# Patient Record
Sex: FEMALE | Race: WHITE | Employment: OTHER | URBAN - METROPOLITAN AREA
[De-identification: names, ages, dates, MRNs, and addresses within clinical notes are randomized per-mention and may not be internally consistent; named-entity substitution may affect disease eponyms.]

---

## 2018-07-06 ENCOUNTER — HOSPITAL ENCOUNTER (OUTPATIENT)
Dept: CARDIOLOGY | Age: 38
Discharge: HOME | End: 2018-07-06
Payer: COMMERCIAL

## 2018-07-06 ENCOUNTER — OFFICE VISIT (OUTPATIENT)
Dept: CARDIOLOGY CLINIC | Age: 38
End: 2018-07-06
Payer: COMMERCIAL

## 2018-07-06 VITALS
BODY MASS INDEX: 22.02 KG/M2 | OXYGEN SATURATION: 99 % | HEART RATE: 76 BPM | WEIGHT: 137 LBS | HEIGHT: 66 IN | SYSTOLIC BLOOD PRESSURE: 120 MMHG | DIASTOLIC BLOOD PRESSURE: 70 MMHG

## 2018-07-06 VITALS
SYSTOLIC BLOOD PRESSURE: 100 MMHG | HEART RATE: 67 BPM | BODY MASS INDEX: 22.02 KG/M2 | DIASTOLIC BLOOD PRESSURE: 60 MMHG | HEIGHT: 66 IN | WEIGHT: 137 LBS

## 2018-07-06 DIAGNOSIS — I44.0 FIRST DEGREE ATRIOVENTRICULAR BLOCK: Primary | ICD-10-CM

## 2018-07-06 DIAGNOSIS — I44.0 FIRST DEGREE ATRIOVENTRICULAR BLOCK: ICD-10-CM

## 2018-07-06 DIAGNOSIS — J93.83 SPONTANEOUS PNEUMOTHORAX: ICD-10-CM

## 2018-07-06 PROCEDURE — 99215 OFFICE O/P EST HI 40 MIN: CPT | Performed by: INTERNAL MEDICINE

## 2018-07-06 PROCEDURE — G8420 CALC BMI NORM PARAMETERS: HCPCS | Performed by: INTERNAL MEDICINE

## 2018-07-06 PROCEDURE — G8427 DOCREV CUR MEDS BY ELIG CLIN: HCPCS | Performed by: INTERNAL MEDICINE

## 2018-07-06 PROCEDURE — 93000 ELECTROCARDIOGRAM COMPLETE: CPT | Performed by: INTERNAL MEDICINE

## 2018-07-06 PROCEDURE — 4004F PT TOBACCO SCREEN RCVD TLK: CPT | Performed by: INTERNAL MEDICINE

## 2018-07-06 PROCEDURE — 93017 CV STRESS TEST TRACING ONLY: CPT

## 2018-07-06 RX ORDER — IBUPROFEN 200 MG
200 TABLET ORAL EVERY 6 HOURS PRN
COMMUNITY
End: 2019-12-11 | Stop reason: ALTCHOICE

## 2018-07-06 RX ORDER — METHYLPREDNISOLONE 4 MG/1
4 TABLET ORAL PRN
COMMUNITY
End: 2019-12-11 | Stop reason: ALTCHOICE

## 2018-07-06 RX ORDER — BENZONATATE 100 MG/1
200 CAPSULE ORAL 3 TIMES DAILY PRN
COMMUNITY
End: 2019-12-11 | Stop reason: ALTCHOICE

## 2018-07-06 RX ORDER — ALBUTEROL SULFATE 90 UG/1
2 AEROSOL, METERED RESPIRATORY (INHALATION) EVERY 6 HOURS PRN
COMMUNITY

## 2018-07-06 RX ORDER — CETIRIZINE HYDROCHLORIDE 10 MG/1
10 TABLET ORAL DAILY
COMMUNITY

## 2018-07-06 RX ORDER — DOXYCYCLINE HYCLATE 100 MG
100 TABLET ORAL 2 TIMES DAILY PRN
COMMUNITY
End: 2019-12-11 | Stop reason: ALTCHOICE

## 2018-07-06 NOTE — PROGRESS NOTES
OUTPATIENT CARDIOLOGY FOLLOW-UP    Name: Andre Arvizu    Age: 40 y.o. Primary Care Physician: Jorge Horowitz MD    Date of Service: 7/6/2018    Chief Complaint: First-degree atrioventricular block    Interim History: Since her prior evaluation of 3 years earlier, the patient was hospitalized at [facility] on 3 separate occasions over the past 2 years due to a spontaneous pneumothorax. During her August, 2016 evidence of sinus pauses all of less than 3 seconds duration were noted with electrophysiology and cardiovascular evaluation obtained along with the implantation of a loop recorder with feelings that evidence of autonomic imbalance as opposed to that appear sinus node dysfunction with the predominant origin. At that time, a loop recorder was implanted with subsequent follow-up monitoring demonstrating periods of bradycardia arrhythmias to approximately 40 bpm with no documentation of high-grade atrioventricular block. At present, she relates continued exertional intolerance and exertional fatigue along with that of intermittent palpitations with activity in the absence of anginal-like chest discomfort or other ischemic equivalents or additional arrhythmia related manifestations. During recent evaluation in your office, she underwent an echocardiogram interpreted by a radiologist as reportedly demonstrating evidence of a normal size left trigger chamber with normal left ventricular systolic function with reported mitral valve prolapse and moderate associated mitral regurgitation with right ventricular enlargement. Review of Systems: The remainder of a complete multisystem review including consitutional, central nervous, respiratory, circulatory, gastrointestinal, genitourinary, endocrinologic, hematologic, musculoskeletal and psychiatric are negative.     Past Medical History:  Past Medical History:   Diagnosis Date    Asthma     Bulging discs     LUMBAR SPINE 4 DISCS    by mouth daily      ibuprofen (ADVIL;MOTRIN) 200 MG tablet Take 200 mg by mouth every 6 hours as needed for Pain      ALPRAZolam (XANAX) 1 MG tablet Take 1 mg by mouth 3 times daily as needed. No current facility-administered medications for this visit. Physical Exam:  /60   Pulse 67   Ht 5' 6\" (1.676 m)   Wt 137 lb (62.1 kg)   BMI 22.11 kg/m²   Wt Readings from Last 3 Encounters:   07/06/18 137 lb (62.1 kg)   02/19/16 136 lb (61.7 kg)   12/14/15 136 lb (61.7 kg)     The patient is awake, alert and in no discomfort or distress. No gross musculoskeletal deformity is present. No significant skin or nail changes are present. Gross examination of head, eyes, nose and throat are negative. Jugular venous pressure is normal and no carotid bruits are present. Normal respiratory effort is noted with no accessory muscle usage present. Lung fields are clear to ascultation. Cardiac examination is notable for a regular rate and rhythm with no palpable thrill. No gallop rhythm, click or cardiac murmur are identified. A benign abdominal examination is present with no masses or organomegaly. Intact pulses are present throughout all extremities and no peripheral edema is present. No focal neurologic deficits are present. Laboratory Tests:  Lab Results   Component Value Date    CREATININE 0.8 05/01/2017    BUN 7 05/01/2017     05/01/2017    K 4.0 05/01/2017     05/01/2017    CO2 23 05/01/2017     No results found for: BNP  Lab Results   Component Value Date    WBC 10.6 05/01/2017    RBC 4.58 05/01/2017    HGB 13.9 05/01/2017    HCT 42.6 05/01/2017    MCV 93.0 05/01/2017    MCH 30.3 05/01/2017    MCHC 32.6 05/01/2017    RDW 12.2 05/01/2017     05/01/2017    MPV 10.3 05/01/2017     No results for input(s): ALKPHOS, ALT, AST, PROT, BILITOT, BILIDIR, LABALBU in the last 72 hours.   Lab Results   Component Value Date    MG 2.1 05/01/2017     No results found for: PROTIME, INR  Lab Results Component Value Date    TSH 1.970 05/01/2017     No components found for: CHLPL  No results found for: TRIG  No results found for: HDL  No results found for: [facility]    Cardiac Tests:  ECG: A resting electrocardiogram demonstrates evidence of sinus rhythm with a first-degree atrioventricular block and AR interval approximately 290 ms  Interrogation of her loop recorder demonstrates evidence of sinus bradycardia with heart rates ranging 40-60 bpm with no apparent high-grade atrioventricular block or additional significant bradycardia  Last echocardiogram: An echocardiogram of June, 2018 interpreted by Lucille Molina reportedly demonstrated evidence of a normal size left ventricular chamber with normal left ventricular systolic function with right ventricular dilatation. Mild mitral valve prolapse with moderate mitral regurgitation was additionally reported. Stress test: In conjunction with her evaluation exercise stress test was performed during which she completed 6 minutes 30 seconds on an accelerated Ma[place] protocol treadmill achieving 76% of age-predicted maximum her neck functional capacity of 11.7 METs. A clinically and electrocardiographically nonischemic response was noted with a normal functional capacity and absence of limiting symptoms of a cardiovascular nature. ASSESSMENT / PLAN:  On a clinical basis, the patient presents with evidence of conduction system disease inclusive of a first-degree atrioventricular block as well as that of a potential mild component of asymptomatic sinus node dysfunction without significant chronotropic incompetence necessitating additional present management. This is extensively been discussed along with the absence of a clear cardiovascular associated origin of her exercise intolerance and/or fatigue.  I have thus have not recommended any intervention beyond that of continued symptom monitoring and continued monitoring of her event recorder with Adalgisa Domínguez along with that of

## 2018-07-06 NOTE — PATIENT INSTRUCTIONS
smoking, you improve the health of everyone who now breathes in your smoke. · Their heart, lung, and cancer risks drop, much like yours. · They are sick less. For babies and small children, living smoke-free means they're less likely to have ear infections, pneumonia, and bronchitis. · If you're a woman who is or will be pregnant someday, quitting smoking means a healthier . · Children who are close to you are less likely to become adult smokers. Where can you learn more? Go to https://Twenty20.compeSword & Plough.Wildfire. org and sign in to your THYME account. Enter [phone] in the [facility] box to learn more about \"Learning About Benefits From Quitting Smoking. \"     If you do not have an account, please click on the \"Sign Up Now\" link. Current as of: 2017  Content Version: 11.6  © 5252-9066 [employer]. Care instructions adapted under license by [facility] ([facility]). If you have questions about a medical condition or this instruction, always ask your healthcare professional. [address] any warranty or liability for your use of this information.

## 2018-07-13 ENCOUNTER — PRE-PROCEDURE TELEPHONE (OUTPATIENT)
Dept: CARDIOLOGY | Age: 38
End: 2018-07-13

## 2018-07-25 ENCOUNTER — TELEPHONE (OUTPATIENT)
Dept: CARDIOLOGY CLINIC | Age: 38
End: 2018-07-25

## 2019-12-11 ENCOUNTER — HOSPITAL ENCOUNTER (OUTPATIENT)
Age: 39
Discharge: HOME | End: 2019-12-11
Payer: COMMERCIAL

## 2019-12-11 ENCOUNTER — ANESTHESIA EVENT (OUTPATIENT)
Dept: OPERATING ROOM | Age: 39
End: 2019-12-11
Payer: COMMERCIAL

## 2019-12-11 LAB
EKG ATRIAL RATE: 52 BPM
EKG P AXIS: 50 DEGREES
EKG P-R INTERVAL: 288 MS
EKG Q-T INTERVAL: 462 MS
EKG QRS DURATION: 92 MS
EKG QTC CALCULATION (BAZETT): 429 MS
EKG R AXIS: 73 DEGREES
EKG T AXIS: 43 DEGREES
EKG VENTRICULAR RATE: 52 BPM

## 2019-12-11 PROCEDURE — 93010 ELECTROCARDIOGRAM REPORT: CPT | Performed by: INTERNAL MEDICINE

## 2019-12-11 PROCEDURE — 93005 ELECTROCARDIOGRAM TRACING: CPT | Performed by: ANESTHESIOLOGY

## 2019-12-13 ASSESSMENT — LIFESTYLE VARIABLES: SMOKING_STATUS: 0

## 2019-12-16 ENCOUNTER — HOSPITAL ENCOUNTER (OUTPATIENT)
Age: 39
Setting detail: OUTPATIENT SURGERY
Discharge: HOME | End: 2019-12-16
Attending: ORTHOPAEDIC SURGERY | Admitting: ORTHOPAEDIC SURGERY
Payer: COMMERCIAL

## 2019-12-16 ENCOUNTER — ANESTHESIA (OUTPATIENT)
Dept: OPERATING ROOM | Age: 39
End: 2019-12-16
Payer: COMMERCIAL

## 2019-12-16 VITALS
TEMPERATURE: 98.6 F | SYSTOLIC BLOOD PRESSURE: 112 MMHG | DIASTOLIC BLOOD PRESSURE: 67 MMHG | OXYGEN SATURATION: 100 % | RESPIRATION RATE: 16 BRPM

## 2019-12-16 VITALS
SYSTOLIC BLOOD PRESSURE: 115 MMHG | RESPIRATION RATE: 16 BRPM | BODY MASS INDEX: 23.63 KG/M2 | HEIGHT: 66 IN | DIASTOLIC BLOOD PRESSURE: 75 MMHG | HEART RATE: 71 BPM | WEIGHT: 147 LBS | TEMPERATURE: 98 F | OXYGEN SATURATION: 100 %

## 2019-12-16 DIAGNOSIS — M77.01 MEDIAL EPICONDYLITIS OF RIGHT ELBOW: Primary | ICD-10-CM

## 2019-12-16 LAB
HCG, URINE, POC: NEGATIVE
Lab: NORMAL
NEGATIVE QC PASS/FAIL: NORMAL
POSITIVE QC PASS/FAIL: NORMAL

## 2019-12-16 PROCEDURE — 6370000000 HC RX 637 (ALT 250 FOR IP): Performed by: ANESTHESIOLOGY

## 2019-12-16 PROCEDURE — 6370000000 HC RX 637 (ALT 250 FOR IP): Performed by: ORTHOPAEDIC SURGERY

## 2019-12-16 PROCEDURE — 81025 URINE PREGNANCY TEST: CPT | Performed by: ORTHOPAEDIC SURGERY

## 2019-12-16 PROCEDURE — 7100000001 HC PACU RECOVERY - ADDTL 15 MIN: Performed by: ORTHOPAEDIC SURGERY

## 2019-12-16 PROCEDURE — 6360000002 HC RX W HCPCS

## 2019-12-16 PROCEDURE — 2500000003 HC RX 250 WO HCPCS: Performed by: ORTHOPAEDIC SURGERY

## 2019-12-16 PROCEDURE — 2580000003 HC RX 258: Performed by: ANESTHESIOLOGY

## 2019-12-16 PROCEDURE — 3700000001 HC ADD 15 MINUTES (ANESTHESIA): Performed by: ORTHOPAEDIC SURGERY

## 2019-12-16 PROCEDURE — 2500000003 HC RX 250 WO HCPCS: Performed by: NURSE ANESTHETIST, CERTIFIED REGISTERED

## 2019-12-16 PROCEDURE — 6360000002 HC RX W HCPCS: Performed by: NURSE ANESTHETIST, CERTIFIED REGISTERED

## 2019-12-16 PROCEDURE — 6360000002 HC RX W HCPCS: Performed by: ANESTHESIOLOGY

## 2019-12-16 PROCEDURE — 2709999900 HC NON-CHARGEABLE SUPPLY: Performed by: ORTHOPAEDIC SURGERY

## 2019-12-16 PROCEDURE — 7100000000 HC PACU RECOVERY - FIRST 15 MIN: Performed by: ORTHOPAEDIC SURGERY

## 2019-12-16 PROCEDURE — 6360000002 HC RX W HCPCS: Performed by: ORTHOPAEDIC SURGERY

## 2019-12-16 PROCEDURE — 7100000010 HC PHASE II RECOVERY - FIRST 15 MIN: Performed by: ORTHOPAEDIC SURGERY

## 2019-12-16 PROCEDURE — 2500000003 HC RX 250 WO HCPCS: Performed by: STUDENT IN AN ORGANIZED HEALTH CARE EDUCATION/TRAINING PROGRAM

## 2019-12-16 PROCEDURE — 3700000000 HC ANESTHESIA ATTENDED CARE: Performed by: ORTHOPAEDIC SURGERY

## 2019-12-16 PROCEDURE — 3600000003 HC SURGERY LEVEL 3 BASE: Performed by: ORTHOPAEDIC SURGERY

## 2019-12-16 PROCEDURE — 3600000013 HC SURGERY LEVEL 3 ADDTL 15MIN: Performed by: ORTHOPAEDIC SURGERY

## 2019-12-16 PROCEDURE — 7100000011 HC PHASE II RECOVERY - ADDTL 15 MIN: Performed by: ORTHOPAEDIC SURGERY

## 2019-12-16 PROCEDURE — 2580000003 HC RX 258: Performed by: ORTHOPAEDIC SURGERY

## 2019-12-16 RX ORDER — FAMOTIDINE 20 MG/1
20 TABLET, FILM COATED ORAL ONCE
Status: COMPLETED | OUTPATIENT
Start: 2019-12-16 | End: 2019-12-16

## 2019-12-16 RX ORDER — PHENYLEPHRINE HYDROCHLORIDE 10 MG/ML
INJECTION INTRAVENOUS PRN
Status: DISCONTINUED | OUTPATIENT
Start: 2019-12-16 | End: 2019-12-16 | Stop reason: SDUPTHER

## 2019-12-16 RX ORDER — PROPOFOL 10 MG/ML
INJECTION, EMULSION INTRAVENOUS PRN
Status: DISCONTINUED | OUTPATIENT
Start: 2019-12-16 | End: 2019-12-16 | Stop reason: SDUPTHER

## 2019-12-16 RX ORDER — METOCLOPRAMIDE 10 MG/1
10 TABLET ORAL ONCE
Status: COMPLETED | OUTPATIENT
Start: 2019-12-16 | End: 2019-12-16

## 2019-12-16 RX ORDER — SODIUM CHLORIDE 0.9 % (FLUSH) 0.9 %
10 SYRINGE (ML) INJECTION PRN
Status: DISCONTINUED | OUTPATIENT
Start: 2019-12-16 | End: 2019-12-16 | Stop reason: HOSPADM

## 2019-12-16 RX ORDER — DEXAMETHASONE SODIUM PHOSPHATE 10 MG/ML
INJECTION, SOLUTION INTRAMUSCULAR; INTRAVENOUS PRN
Status: DISCONTINUED | OUTPATIENT
Start: 2019-12-16 | End: 2019-12-16 | Stop reason: SDUPTHER

## 2019-12-16 RX ORDER — SODIUM CHLORIDE 9 MG/ML
INJECTION, SOLUTION INTRAVENOUS CONTINUOUS
Status: DISCONTINUED | OUTPATIENT
Start: 2019-12-16 | End: 2019-12-16 | Stop reason: HOSPADM

## 2019-12-16 RX ORDER — ONDANSETRON 2 MG/ML
INJECTION INTRAMUSCULAR; INTRAVENOUS PRN
Status: DISCONTINUED | OUTPATIENT
Start: 2019-12-16 | End: 2019-12-16 | Stop reason: SDUPTHER

## 2019-12-16 RX ORDER — LIDOCAINE HYDROCHLORIDE 20 MG/ML
INJECTION, SOLUTION INTRAVENOUS PRN
Status: DISCONTINUED | OUTPATIENT
Start: 2019-12-16 | End: 2019-12-16 | Stop reason: SDUPTHER

## 2019-12-16 RX ORDER — DIAPER,BRIEF,INFANT-TODD,DISP
EACH MISCELLANEOUS PRN
Status: DISCONTINUED | OUTPATIENT
Start: 2019-12-16 | End: 2019-12-16 | Stop reason: ALTCHOICE

## 2019-12-16 RX ORDER — CLINDAMYCIN PHOSPHATE 900 MG/50ML
900 INJECTION INTRAVENOUS
Status: COMPLETED | OUTPATIENT
Start: 2019-12-16 | End: 2019-12-16

## 2019-12-16 RX ORDER — FENTANYL CITRATE 50 UG/ML
INJECTION, SOLUTION INTRAMUSCULAR; INTRAVENOUS PRN
Status: DISCONTINUED | OUTPATIENT
Start: 2019-12-16 | End: 2019-12-16 | Stop reason: SDUPTHER

## 2019-12-16 RX ORDER — SODIUM CHLORIDE, SODIUM LACTATE, POTASSIUM CHLORIDE, CALCIUM CHLORIDE 600; 310; 30; 20 MG/100ML; MG/100ML; MG/100ML; MG/100ML
INJECTION, SOLUTION INTRAVENOUS CONTINUOUS
Status: DISCONTINUED | OUTPATIENT
Start: 2019-12-16 | End: 2019-12-16 | Stop reason: HOSPADM

## 2019-12-16 RX ORDER — GLYCOPYRROLATE 1 MG/5 ML
SYRINGE (ML) INTRAVENOUS PRN
Status: DISCONTINUED | OUTPATIENT
Start: 2019-12-16 | End: 2019-12-16 | Stop reason: SDUPTHER

## 2019-12-16 RX ORDER — FENTANYL CITRATE 50 UG/ML
25 INJECTION, SOLUTION INTRAMUSCULAR; INTRAVENOUS EVERY 5 MIN PRN
Status: DISCONTINUED | OUTPATIENT
Start: 2019-12-16 | End: 2019-12-16 | Stop reason: HOSPADM

## 2019-12-16 RX ORDER — SODIUM CHLORIDE 0.9 % (FLUSH) 0.9 %
10 SYRINGE (ML) INJECTION EVERY 12 HOURS SCHEDULED
Status: DISCONTINUED | OUTPATIENT
Start: 2019-12-16 | End: 2019-12-16 | Stop reason: HOSPADM

## 2019-12-16 RX ORDER — HYDROMORPHONE HYDROCHLORIDE 1 MG/ML
0.5 INJECTION, SOLUTION INTRAMUSCULAR; INTRAVENOUS; SUBCUTANEOUS EVERY 5 MIN PRN
Status: DISCONTINUED | OUTPATIENT
Start: 2019-12-16 | End: 2019-12-16 | Stop reason: HOSPADM

## 2019-12-16 RX ORDER — MIDAZOLAM HYDROCHLORIDE 1 MG/ML
INJECTION INTRAMUSCULAR; INTRAVENOUS PRN
Status: DISCONTINUED | OUTPATIENT
Start: 2019-12-16 | End: 2019-12-16 | Stop reason: SDUPTHER

## 2019-12-16 RX ORDER — HYDROCODONE BITARTRATE AND ACETAMINOPHEN 5; 325 MG/1; MG/1
1 TABLET ORAL PRN
Status: COMPLETED | OUTPATIENT
Start: 2019-12-16 | End: 2019-12-16

## 2019-12-16 RX ORDER — MORPHINE SULFATE 10 MG/ML
INJECTION, SOLUTION INTRAMUSCULAR; INTRAVENOUS
Status: COMPLETED
Start: 2019-12-16 | End: 2019-12-16

## 2019-12-16 RX ORDER — MORPHINE SULFATE 2 MG/ML
2 INJECTION, SOLUTION INTRAMUSCULAR; INTRAVENOUS EVERY 5 MIN PRN
Status: DISCONTINUED | OUTPATIENT
Start: 2019-12-16 | End: 2019-12-16 | Stop reason: HOSPADM

## 2019-12-16 RX ORDER — HYDROCODONE BITARTRATE AND ACETAMINOPHEN 5; 325 MG/1; MG/1
2 TABLET ORAL PRN
Status: COMPLETED | OUTPATIENT
Start: 2019-12-16 | End: 2019-12-16

## 2019-12-16 RX ORDER — POVIDONE-IODINE 10 MG/G
OINTMENT TOPICAL PRN
Status: DISCONTINUED | OUTPATIENT
Start: 2019-12-16 | End: 2019-12-16 | Stop reason: HOSPADM

## 2019-12-16 RX ORDER — HYDROCODONE BITARTRATE AND ACETAMINOPHEN 5; 325 MG/1; MG/1
1 TABLET ORAL EVERY 8 HOURS PRN
Qty: 21 TABLET | Refills: 0 | Status: SHIPPED | OUTPATIENT
Start: 2019-12-16 | End: 2019-12-23

## 2019-12-16 RX ORDER — CLINDAMYCIN PHOSPHATE 900 MG/50ML
900 INJECTION INTRAVENOUS
Qty: 50 ML | Refills: 0 | Status: SHIPPED | OUTPATIENT
Start: 2019-12-16 | End: 2019-12-16

## 2019-12-16 RX ORDER — BUPIVACAINE HYDROCHLORIDE AND EPINEPHRINE 5; 5 MG/ML; UG/ML
INJECTION, SOLUTION EPIDURAL; INTRACAUDAL; PERINEURAL PRN
Status: DISCONTINUED | OUTPATIENT
Start: 2019-12-16 | End: 2019-12-16 | Stop reason: ALTCHOICE

## 2019-12-16 RX ADMIN — FENTANYL CITRATE 25 MCG: 50 INJECTION, SOLUTION INTRAMUSCULAR; INTRAVENOUS at 07:00

## 2019-12-16 RX ADMIN — DEXAMETHASONE SODIUM PHOSPHATE 10 MG: 10 INJECTION, SOLUTION INTRAMUSCULAR; INTRAVENOUS at 07:12

## 2019-12-16 RX ADMIN — MIDAZOLAM 2 MG: 1 INJECTION INTRAMUSCULAR; INTRAVENOUS at 06:59

## 2019-12-16 RX ADMIN — HYDROCODONE BITARTRATE AND ACETAMINOPHEN 1 TABLET: 5; 325 TABLET ORAL at 08:35

## 2019-12-16 RX ADMIN — PHENYLEPHRINE HYDROCHLORIDE 100 MCG: 10 INJECTION INTRAVENOUS at 07:13

## 2019-12-16 RX ADMIN — FENTANYL CITRATE 50 MCG: 50 INJECTION, SOLUTION INTRAMUSCULAR; INTRAVENOUS at 07:07

## 2019-12-16 RX ADMIN — FENTANYL CITRATE 25 MCG: 50 INJECTION, SOLUTION INTRAMUSCULAR; INTRAVENOUS at 07:33

## 2019-12-16 RX ADMIN — CLINDAMYCIN PHOSPHATE 900 MG: 18 INJECTION, SOLUTION INTRAVENOUS at 07:00

## 2019-12-16 RX ADMIN — SODIUM CHLORIDE, POTASSIUM CHLORIDE, SODIUM LACTATE AND CALCIUM CHLORIDE: 600; 310; 30; 20 INJECTION, SOLUTION INTRAVENOUS at 06:59

## 2019-12-16 RX ADMIN — PROPOFOL 150 MG: 10 INJECTION, EMULSION INTRAVENOUS at 07:07

## 2019-12-16 RX ADMIN — FENTANYL CITRATE 25 MCG: 50 INJECTION, SOLUTION INTRAMUSCULAR; INTRAVENOUS at 07:40

## 2019-12-16 RX ADMIN — METOCLOPRAMIDE 10 MG: 10 TABLET ORAL at 06:30

## 2019-12-16 RX ADMIN — FENTANYL CITRATE 25 MCG: 50 INJECTION, SOLUTION INTRAMUSCULAR; INTRAVENOUS at 07:50

## 2019-12-16 RX ADMIN — FENTANYL CITRATE 25 MCG: 50 INJECTION, SOLUTION INTRAMUSCULAR; INTRAVENOUS at 06:59

## 2019-12-16 RX ADMIN — FENTANYL CITRATE 25 MCG: 50 INJECTION, SOLUTION INTRAMUSCULAR; INTRAVENOUS at 07:24

## 2019-12-16 RX ADMIN — FAMOTIDINE 20 MG: 20 TABLET ORAL at 06:30

## 2019-12-16 RX ADMIN — ONDANSETRON HYDROCHLORIDE 4 MG: 2 INJECTION, SOLUTION INTRAMUSCULAR; INTRAVENOUS at 07:28

## 2019-12-16 RX ADMIN — CEFAZOLIN 2 G: 1 INJECTION, POWDER, FOR SOLUTION INTRAMUSCULAR; INTRAVENOUS at 07:00

## 2019-12-16 RX ADMIN — MORPHINE SULFATE 2 MG: 2 INJECTION, SOLUTION INTRAMUSCULAR; INTRAVENOUS at 08:17

## 2019-12-16 RX ADMIN — Medication 0.2 MG: at 07:07

## 2019-12-16 RX ADMIN — MORPHINE SULFATE 2 MG: 10 INJECTION INTRAVENOUS at 08:10

## 2019-12-16 RX ADMIN — FENTANYL CITRATE 25 MCG: 50 INJECTION, SOLUTION INTRAMUSCULAR; INTRAVENOUS at 07:18

## 2019-12-16 RX ADMIN — FENTANYL CITRATE 25 MCG: 50 INJECTION, SOLUTION INTRAMUSCULAR; INTRAVENOUS at 07:54

## 2019-12-16 RX ADMIN — LIDOCAINE HYDROCHLORIDE 60 MG: 20 INJECTION, SOLUTION INTRAVENOUS at 07:07

## 2019-12-16 RX ADMIN — SODIUM CHLORIDE, POTASSIUM CHLORIDE, SODIUM LACTATE AND CALCIUM CHLORIDE: 600; 310; 30; 20 INJECTION, SOLUTION INTRAVENOUS at 06:49

## 2019-12-16 ASSESSMENT — PAIN SCALES - GENERAL
PAINLEVEL_OUTOF10: 8
PAINLEVEL_OUTOF10: 10
PAINLEVEL_OUTOF10: 4
PAINLEVEL_OUTOF10: 8
PAINLEVEL_OUTOF10: 7
PAINLEVEL_OUTOF10: 0
PAINLEVEL_OUTOF10: 7
PAINLEVEL_OUTOF10: 8
PAINLEVEL_OUTOF10: 10

## 2019-12-16 ASSESSMENT — PULMONARY FUNCTION TESTS
PIF_VALUE: 2
PIF_VALUE: 4
PIF_VALUE: 2
PIF_VALUE: 4
PIF_VALUE: 2
PIF_VALUE: 4
PIF_VALUE: 1
PIF_VALUE: 2
PIF_VALUE: 21
PIF_VALUE: 2
PIF_VALUE: 8
PIF_VALUE: 2
PIF_VALUE: 0
PIF_VALUE: 3
PIF_VALUE: 2
PIF_VALUE: 1
PIF_VALUE: 2
PIF_VALUE: 2
PIF_VALUE: 4
PIF_VALUE: 2
PIF_VALUE: 2
PIF_VALUE: 1
PIF_VALUE: 2
PIF_VALUE: 16
PIF_VALUE: 2
PIF_VALUE: 1
PIF_VALUE: 2
PIF_VALUE: 1

## 2019-12-16 ASSESSMENT — PAIN DESCRIPTION - PROGRESSION
CLINICAL_PROGRESSION: GRADUALLY IMPROVING

## 2019-12-16 ASSESSMENT — PAIN DESCRIPTION - PAIN TYPE
TYPE: SURGICAL PAIN

## 2019-12-16 ASSESSMENT — PAIN DESCRIPTION - DESCRIPTORS: DESCRIPTORS: ACHING;DISCOMFORT

## 2019-12-16 ASSESSMENT — PAIN - FUNCTIONAL ASSESSMENT: PAIN_FUNCTIONAL_ASSESSMENT: 0-10

## 2020-01-20 VITALS
TEMPERATURE: 97.8 F | BODY MASS INDEX: 23.63 KG/M2 | HEIGHT: 66 IN | SYSTOLIC BLOOD PRESSURE: 130 MMHG | WEIGHT: 147 LBS | DIASTOLIC BLOOD PRESSURE: 82 MMHG | HEART RATE: 69 BPM

## 2020-01-20 RX ORDER — PREDNISONE 20 MG/1
20 TABLET ORAL DAILY
COMMUNITY
End: 2020-02-10

## 2020-01-20 RX ORDER — MECLIZINE HYDROCHLORIDE 25 MG/1
25 TABLET ORAL 3 TIMES DAILY PRN
COMMUNITY
End: 2020-02-10

## 2020-01-20 RX ORDER — IBUPROFEN 200 MG
200 TABLET ORAL PRN
COMMUNITY

## 2020-01-20 RX ORDER — DEXTROMETHORPHAN HYDROBROMIDE AND PROMETHAZINE HYDROCHLORIDE 15; 6.25 MG/5ML; MG/5ML
5 SYRUP ORAL 4 TIMES DAILY PRN
COMMUNITY
End: 2020-02-10

## 2020-01-20 RX ORDER — IPRATROPIUM BROMIDE AND ALBUTEROL SULFATE 2.5; .5 MG/3ML; MG/3ML
1 SOLUTION RESPIRATORY (INHALATION) EVERY 4 HOURS
COMMUNITY

## 2020-01-20 RX ORDER — LORATADINE 10 MG/1
10 CAPSULE, LIQUID FILLED ORAL DAILY
COMMUNITY
End: 2020-02-10

## 2020-02-03 NOTE — PROGRESS NOTES
[facility] Cardiology Progress Note  Dr. Hazel Montero      Referring Physician: Alfredo Bentley MD  CHIEF COMPLAINT:   Chief Complaint   Patient presents with    Palpitations     Annual. Pt has no cardiac complaints today       HISTORY OF PRESENT ILLNESS:   Patient is 46year old female with past medical history of first degree atrioventricular block, mitral valve regurgitation, asthma, bulging discs, is here for follow-up appointment  Occasional extra beats here and there,Patient denies any chest pain, no shortness of breath, no lightheadedness, no dizziness,  no pedal edema, no PND, no orthopnea, no syncope, no presyncopal episodes. Had loop recorder implanted 2 years ago for evaluation of her bradycardia and pauses, she follows with Dr. Helena Dietrich  Functional capacity is at baseline   Just lost her stepdad      Past Medical History:   Diagnosis Date    Acute upper respiratory infection, unspecified     Asthma     Bradycardia     occurs during sleep ( HR drops to 30-40). see's Dr Helena Dietrich   (has Loop recorder)  denies any dizziness or syncope    Bulging discs     LUMBAR SPINE 4 DISCS    COPD (chronic obstructive pulmonary disease) (HCC)     mild    MVP (mitral valve prolapse)     Palpitations     Slipped intervertebral disc     L5-S1    Unspecified asthma with (acute) exacerbation     Vaginal delivery     x 2         Past Surgical History:   Procedure Laterality Date    DILATION AND CURETTAGE OF UTERUS      ELBOW FRACTURE SURGERY Right 12/16/2019    REPAIR RIGHT LATERAL EPICONDYLECTOMY (CPT 31784) performed by Riley Perales DO at [place] [id number] Right 12/16/2019    INSERTABLE CARDIAC MONITOR  08/2016    Dr Ashleigh Gardner Left 10/26/2016    Excision of apical left lung, apical blebs.  Pleurodesis  had spontaneous pneumothorax    NECK SURGERY      CYST AND HYOID BONE REMOVED  Dr Alyssa Pinto Left 10/18/2016    for spontaneous pneumothorax Current Outpatient Medications   Medication Sig Dispense Refill    ipratropium-albuterol (DUONEB) 0.5-2.5 (3) MG/3ML SOLN nebulizer solution Inhale 1 vial into the lungs every 4 hours One neb. Treatment given in uc. [place]      ibuprofen (ADVIL;MOTRIN) 200 MG tablet Take 200 mg by mouth as needed for Pain      mometasone-formoterol (DULERA) 100-5 MCG/ACT inhaler Inhale 2 puffs into the lungs 2 times daily      albuterol sulfate HFA (VENTOLIN HFA) 108 (90 Base) MCG/ACT inhaler Inhale 2 puffs into the lungs every 6 hours as needed for Wheezing      cetirizine (ZYRTEC ALLERGY) 10 MG tablet Take 10 mg by mouth daily      ALPRAZolam (XANAX) 1 MG tablet Take 1 mg by mouth 3 times daily as needed. No current facility-administered medications for this visit. Allergies as of 02/10/2020 - Review Complete 02/10/2020   Allergen Reaction Noted    Penicillins Shortness Of Breath 11/12/2014    Amoxicillin  01/20/2020       Social History     Socioeconomic History    Marital status: Single     Spouse name: Not on file    Number of children: Not on file    Years of education: Not on file    Highest education level: Not on file   Occupational History    Not on file   Social Needs    Financial resource strain: Not on file    Food insecurity:     Worry: Not on file     Inability: Not on file    Transportation needs:     Medical: Not on file     Non-medical: Not on file   Tobacco Use    Smoking status: Former Smoker     Years: 20.00     Types: Cigarettes    Smokeless tobacco: Never Used   Substance and Sexual Activity    Alcohol use: No     Comment: 3 cups coffee per day.  pop daily    Drug use: No    Sexual activity: Not on file   Lifestyle    Physical activity:     Days per week: Not on file     Minutes per session: Not on file    Stress: Not on file   Relationships    Social connections:     Talks on phone: Not on file     Gets together: Not on file     Attends [employer] service: Not normal.   HEMA/LYMPH: No cervical lymphadenopathy and no supraclavicular lymphadenopathy. LUNGS: No increased work of breathing, good air exchange, clear to auscultation bilaterally, no crackles or wheezing. Cardiovascular: Normal apical impulse, regular rate and rhythm, normal S1 and S2, no S3 or S4, 3/6 systolic murmur at the apex, no pedal edema, good carotid upstroke bilaterally, no carotid bruit, no JVD, no abdominal pulsating masses. ABDOMEN: Soft, nontender, no hepatomegaly, no splenomegaly, bowel sound positive. CHEST:  Expands symmetrically, nontender to palpation. Musculoskeletal:  No clubbing or cyanosis. No redness, warmth, or swelling of the joints. Neurological: Alert, awake, and oriented X3, no focal neurologic deficit was appreciated. SKIN: No bruises, no bleeding, normal skin color, texture, turgor and no redness, warmth or swelling. /64 (Site: Left Upper Arm, Position: Sitting, Cuff Size: Medium Adult)   Pulse 67   Resp 16   Ht 5' 6\" (1.676 m)   Wt 148 lb (67.1 kg)   BMI 23.89 kg/m²     DATA:   I personally reviewed the visit EKG with the following interpretation: Sinus rhythm         12/11/19 Sinus bradycardia with sinus arrhythmia with 1st degree AV block  Otherwise normal ECG  No previous ECGs available        ECHO: 3/5/19 Normal left ventricular systolic and diastolic dysfunction EF 15%. Mild mitral regurgitation. Trace tricuspid regurgitation with mild pulmonary HTN. Trace aortic regurgitation. Stress Test: 7/6/18 Impression:    1. Exercise EKG was normal.    2. The patient experienced no chest pain with exercise. 3. [place] treadmill score was 10 implying low risk of acute   ischemic events.    4. Exercise capacity was average.      Cardiology Labs: BMP:    Lab Results   Component Value Date     05/01/2017    K 4.0 05/01/2017     05/01/2017    CO2 23 05/01/2017    BUN 7 05/01/2017    CREATININE 0.8 05/01/2017     CMP:    Lab Results   Component Value Date     05/01/2017    K 4.0 05/01/2017     05/01/2017    CO2 23 05/01/2017    BUN 7 05/01/2017    CREATININE 0.8 05/01/2017    PROT 7.2 05/01/2017     CBC:    Lab Results   Component Value Date    WBC 10.6 05/01/2017    RBC 4.58 05/01/2017    HGB 13.9 05/01/2017    HCT 42.6 05/01/2017    MCV 93.0 05/01/2017    RDW 12.2 05/01/2017     05/01/2017     PT/INR:  No results found for: PTINR  PT/INR Warfarin:  No components found for: PTPATWAR, PTINRWAR  PTT:  No results found for: APTT  PTT Heparin:  No components found for: APTTHEP  Magnesium:    Lab Results   Component Value Date    MG 2.1 05/01/2017     TSH:    Lab Results   Component Value Date    TSH 1.970 05/01/2017     TROPONIN:  No components found for: TROP  BNP:  No results found for: BNP  FASTING LIPID PANEL:  No results found for: CHOL, HDL, TRIG  No orders to display     I have personally reviewed the laboratory, cardiac diagnostic and radiographic testing as outlined above:      IMPRESSION:  1:  Dizziness and giddiness: resolved    2:  Nonrheumatic mitral (valve) insufficiency:  Moderate by an echocardiogram done in June 2018      3 Nonrheumatic tricuspid (valve) insufficiency :  Mild                              RECOMMENDATIONS:   1. Continue current treatment  2. Preventive cardiology: Low-salt, low-cholesterol diet, daily exercise, were all advised. 3.  Follow-up with Dr. Yoly Schmidt as scheduled  4. Follow-up with Dr. Della Jackson in 1 year, sooner if symptomatic for any reason    I have reviewed my findings and recommendations with patient    Electronically signed by Leandro Ricketts MD on 2/10/2020 at 11:07 AM  NOTE: This report was transcribed using voice recognition software.  Every effort was made to ensure accuracy; however, inadvertent computerized transcription errors may be present

## 2020-02-10 ENCOUNTER — OFFICE VISIT (OUTPATIENT)
Dept: CARDIOLOGY CLINIC | Age: 40
End: 2020-02-10
Payer: COMMERCIAL

## 2020-02-10 VITALS
WEIGHT: 148 LBS | DIASTOLIC BLOOD PRESSURE: 64 MMHG | HEIGHT: 66 IN | RESPIRATION RATE: 16 BRPM | BODY MASS INDEX: 23.78 KG/M2 | SYSTOLIC BLOOD PRESSURE: 112 MMHG | HEART RATE: 67 BPM

## 2020-02-10 PROCEDURE — 93000 ELECTROCARDIOGRAM COMPLETE: CPT | Performed by: INTERNAL MEDICINE

## 2020-02-10 PROCEDURE — 99213 OFFICE O/P EST LOW 20 MIN: CPT | Performed by: INTERNAL MEDICINE

## 2021-02-06 NOTE — PROGRESS NOTES
[facility] Cardiology Progress Note  Dr. Mateus Carrera      Referring Physician: Daniella Henry MD  CHIEF COMPLAINT:   Chief Complaint   Patient presents with    Palpitations     Annual. Pt has no cardiac complaints today       HISTORY OF PRESENT ILLNESS:   Patient is 45year old female with past medical history of first degree atrioventricular block, mitral valve regurgitation, asthma, bulging discs, is here for follow-up appointment  Occasional extra beats here and there,Patient denies any chest pain, no shortness of breath, no lightheadedness, no dizziness,  no pedal edema, no PND, no orthopnea, no syncope, no presyncopal episodes. Had loop recorder implanted more than 3 years ago for evaluation of her bradycardia and pauses, she follows with Dr. Jeronimo Oliveira  Functional capacity is at baseline       Past Medical History:   Diagnosis Date    Acute upper respiratory infection, unspecified     Asthma     Bradycardia     occurs during sleep ( HR drops to 30-40). see's Dr Jeronimo Oliviera   (has Loop recorder)  denies any dizziness or syncope    Bulging discs     LUMBAR SPINE 4 DISCS    COPD (chronic obstructive pulmonary disease) (HCC)     mild    MVP (mitral valve prolapse)     Palpitations     Slipped intervertebral disc     L5-S1    Unspecified asthma with (acute) exacerbation     Vaginal delivery     x 2         Past Surgical History:   Procedure Laterality Date    DILATION AND CURETTAGE OF UTERUS      ELBOW FRACTURE SURGERY Right 12/16/2019    REPAIR RIGHT LATERAL EPICONDYLECTOMY (CPT 69060) performed by Nj Ann DO at [place] [address] Right 12/16/2019    INSERTABLE CARDIAC MONITOR  08/2016    Dr Agatha Mcdowell Left 10/26/2016    Excision of apical left lung, apical blebs.  Pleurodesis  had spontaneous pneumothorax    NECK SURGERY      CYST AND HYOID BONE REMOVED  Dr Mark Morrison Left 10/18/2016    for spontaneous pneumothorax Current Outpatient Medications   Medication Sig Dispense Refill    ipratropium-albuterol (DUONEB) 0.5-2.5 (3) MG/3ML SOLN nebulizer solution Inhale 1 vial into the lungs every 4 hours One neb. Treatment given in uc. [place]      ibuprofen (ADVIL;MOTRIN) 200 MG tablet Take 200 mg by mouth as needed for Pain      mometasone-formoterol (DULERA) 100-5 MCG/ACT inhaler Inhale 2 puffs into the lungs 2 times daily      albuterol sulfate HFA (VENTOLIN HFA) 108 (90 Base) MCG/ACT inhaler Inhale 2 puffs into the lungs every 6 hours as needed for Wheezing      cetirizine (ZYRTEC ALLERGY) 10 MG tablet Take 10 mg by mouth daily      ALPRAZolam (XANAX) 1 MG tablet Take 1 mg by mouth 3 times daily as needed. No current facility-administered medications for this visit. Allergies as of 02/11/2021 - Review Complete 02/11/2021   Allergen Reaction Noted    Penicillins Shortness Of Breath 11/12/2014    Amoxicillin  01/20/2020       Social History     Socioeconomic History    Marital status: Single     Spouse name: Not on file    Number of children: Not on file    Years of education: Not on file    Highest education level: Not on file   Occupational History    Not on file   Social Needs    Financial resource strain: Not on file    Food insecurity     Worry: Not on file     Inability: Not on file   [employer] needs     Medical: Not on file     Non-medical: Not on file   Tobacco Use    Smoking status: Former Smoker     Years: 20.00     Types: Cigarettes    Smokeless tobacco: Never Used   Substance and Sexual Activity    Alcohol use: No     Comment: 3 cups coffee per day.  pop daily    Drug use: No    Sexual activity: Not on file   Lifestyle    Physical activity     Days per week: Not on file     Minutes per session: Not on file    Stress: Not on file   Relationships    Social connections     Talks on phone: Not on file     Gets together: Not on file     Attends Taoism service: Not on file     Active member of club or organization: Not on file     Attends meetings of clubs or organizations: Not on file     Relationship status: Not on file    Intimate partner violence     Fear of current or ex partner: Not on file     Emotionally abused: Not on file     Physically abused: Not on file     Forced sexual activity: Not on file   Other Topics Concern    Not on file   Social History Narrative    Not on file       Family History   Problem Relation Age of Onset    Hypertension Mother     COPD Mother     Thyroid Disease Mother     Other Mother 62        pacemaker       REVIEW OF SYSTEMS:     CONSTITUTIONAL:  negative for  fevers, chills, sweats and fatigue  HEENT:  negative for  tinnitus, earaches, nasal congestion and epistaxis  RESPIRATORY:  negative for  dry cough, cough with sputum, dyspnea, wheezing and hemoptysis  GASTROINTESTINAL:  negative for nausea, vomiting, diarrhea, constipation, pruritus and jaundice  HEMATOLOGIC/LYMPHATIC:  negative for easy bruising, bleeding, lymphadenopathy and petechiae  ENDOCRINE:  negative for heat intolerance, cold intolerance, tremor, hair loss and diabetic symptoms including neither polyuria nor polydipsia nor blurred vision  MUSCULOSKELETAL:  negative for  myalgias, arthralgias, joint swelling, stiff joints and decreased range of motion  NEUROLOGICAL:  negative for memory problems, speech problems, visual disturbance, dysphagia, weakness and numbness      PHYSICAL EXAM:   Constitutional:  Awake, alert cooperative, no apparent distress, and appears stated age. EYES: Anicteric sclerae, lids and lashes normal and pupils equal, round and reactive to light. HEENT:  Moist and pink mucous membranes, normocephalic, without obvious abnormality, atraumatic, normal ears and nose.    NECK:  Supple, symmetrical, trachea midline, no JVD, no adenopathy, thyroid symmetric, not enlarged and no tenderness, good carotid upstroke bilaterally, no carotid bruit, skin normal. HEMA/LYMPH: No cervical lymphadenopathy and no supraclavicular lymphadenopathy. LUNGS: No increased work of breathing, good air exchange, clear to auscultation bilaterally, no crackles or wheezing. Cardiovascular: Normal apical impulse, regular rate and rhythm, normal S1 and S2, no S3 or S4, 3/6 systolic murmur at the apex, no pedal edema, good carotid upstroke bilaterally, no carotid bruit, no JVD, no abdominal pulsating masses. ABDOMEN: Soft, nontender, no hepatomegaly, no splenomegaly, bowel sound positive. CHEST:  Expands symmetrically, nontender to palpation. Musculoskeletal:  No clubbing or cyanosis. No redness, warmth, or swelling of the joints. Neurological: Alert, awake, and oriented X3, no focal neurologic deficit was appreciated. SKIN: No bruises, no bleeding, normal skin color, texture, turgor and no redness, warmth or swelling. /78   Pulse 76   Ht 5' 6\" (1.676 m)   Wt 144 lb (65.3 kg)   BMI 23.24 kg/m²     DATA:   I personally reviewed the visit EKG with the following interpretation: Sinus rhythm, first-degree AV block, normal axis, no significant changes when compared to previous    EKG 2/10/20 Sinus rhythm     ECHO: 3/5/19 Normal left ventricular systolic and diastolic dysfunction EF 69%. Mild mitral regurgitation. Trace tricuspid regurgitation with mild pulmonary HTN. Trace aortic regurgitation. Stress Test: 7/6/18 Impression:    1. Exercise EKG was normal.    2. The patient experienced no chest pain with exercise. 3. [place] treadmill score was 10 implying low risk of acute   ischemic events.    4. Exercise capacity was average.      Cardiology Labs: BMP:    Lab Results   Component Value Date     05/01/2017    K 4.0 05/01/2017     05/01/2017    CO2 23 05/01/2017    BUN 7 05/01/2017    CREATININE 0.8 05/01/2017     CMP:    Lab Results   Component Value Date     05/01/2017    K 4.0 05/01/2017     05/01/2017    CO2 23 05/01/2017    BUN 7 05/01/2017    CREATININE 0.8 05/01/2017    PROT 7.2 05/01/2017     CBC:    Lab Results   Component Value Date    WBC 10.6 05/01/2017    RBC 4.58 05/01/2017    HGB 13.9 05/01/2017    HCT 42.6 05/01/2017    MCV 93.0 05/01/2017    RDW 12.2 05/01/2017     05/01/2017     PT/INR:  No results found for: PTINR  PT/INR Warfarin:  No components found for: PTPATWAR, PTINRWAR  PTT:  No results found for: APTT  PTT Heparin:  No components found for: APTTHEP  Magnesium:    Lab Results   Component Value Date    MG 2.1 05/01/2017     TSH:    Lab Results   Component Value Date    TSH 1.970 05/01/2017     TROPONIN:  No components found for: TROP  BNP:  No results found for: BNP  FASTING LIPID PANEL:  No results found for: CHOL, HDL, TRIG  No orders to display     I have personally reviewed the laboratory, cardiac diagnostic and radiographic testing as outlined above:      IMPRESSION:  1:  Dizziness and giddiness: resolved  2:  Nonrheumatic mitral (valve) insufficiency:  Moderate by an echocardiogram done in June 2018    3 Nonrheumatic tricuspid (valve) insufficiency :  Mild                              RECOMMENDATIONS:   1. Continue current treatment  2.  Echocardiogram next visit to reevaluate moderate mitral valve regurgitation  3. Preventive cardiology: Low-salt, low-cholesterol diet, daily exercise, were all advised. 4.  Follow-up with Dr. Anne Marie Crump as scheduled  5. Follow-up with Dr. Salina Moreno in 1 year, sooner if symptomatic for any reason    I have reviewed my findings and recommendations with patient    Electronically signed by Gurinder Schwarz MD on 2/14/2021 at 7:42 PM  NOTE: This report was transcribed using voice recognition software.  Every effort was made to ensure accuracy; however, inadvertent computerized transcription errors may be present

## 2021-02-11 ENCOUNTER — OFFICE VISIT (OUTPATIENT)
Dept: CARDIOLOGY CLINIC | Age: 41
End: 2021-02-11
Payer: COMMERCIAL

## 2021-02-11 VITALS
BODY MASS INDEX: 23.14 KG/M2 | HEART RATE: 76 BPM | SYSTOLIC BLOOD PRESSURE: 126 MMHG | DIASTOLIC BLOOD PRESSURE: 78 MMHG | HEIGHT: 66 IN | WEIGHT: 144 LBS

## 2021-02-11 DIAGNOSIS — R00.2 PALPITATIONS: Primary | ICD-10-CM

## 2021-02-11 PROCEDURE — G8484 FLU IMMUNIZE NO ADMIN: HCPCS | Performed by: INTERNAL MEDICINE

## 2021-02-11 PROCEDURE — G8420 CALC BMI NORM PARAMETERS: HCPCS | Performed by: INTERNAL MEDICINE

## 2021-02-11 PROCEDURE — 1036F TOBACCO NON-USER: CPT | Performed by: INTERNAL MEDICINE

## 2021-02-11 PROCEDURE — 99213 OFFICE O/P EST LOW 20 MIN: CPT | Performed by: INTERNAL MEDICINE

## 2021-02-11 PROCEDURE — 93000 ELECTROCARDIOGRAM COMPLETE: CPT | Performed by: INTERNAL MEDICINE

## 2021-02-11 PROCEDURE — G8427 DOCREV CUR MEDS BY ELIG CLIN: HCPCS | Performed by: INTERNAL MEDICINE

## 2022-03-10 NOTE — PROGRESS NOTES
[facility] Cardiology Progress Note  Dr. Marsha Rodriguez      Referring Physician: Brittany Walker MD  CHIEF COMPLAINT:   Chief Complaint   Patient presents with    Heart Problem     Annual, patient states last weekend she had palpitations and fatigue       HISTORY OF PRESENT ILLNESS:   Patient is 37year old female with past medical history of first degree atrioventricular block, mitral valve regurgitation, asthma, bulging discs, is here for follow-up appointment  Palpitations with shortness of breath with exertion, no chest pain, no lightheadedness, no dizziness,  no pedal edema, no PND, no orthopnea, no syncope, no presyncopal episodes. Functional capacity is at baseline       Past Medical History:   Diagnosis Date    Acute upper respiratory infection, unspecified     Asthma     Bradycardia     occurs during sleep ( HR drops to 30-40). see's Dr Colleen Leo   (has Loop recorder)  denies any dizziness or syncope    Bulging discs     LUMBAR SPINE 4 DISCS    COPD (chronic obstructive pulmonary disease) (HCC)     mild    MVP (mitral valve prolapse)     Palpitations     Slipped intervertebral disc     L5-S1    Unspecified asthma with (acute) exacerbation     Vaginal delivery     x 2         Past Surgical History:   Procedure Laterality Date    DILATION AND CURETTAGE OF UTERUS      ELBOW FRACTURE SURGERY Right 12/16/2019    REPAIR RIGHT LATERAL EPICONDYLECTOMY (CPT 71293) performed by Baxter Ganser, DO at [place] [address] Right 12/16/2019    INSERTABLE CARDIAC MONITOR  08/2016    Dr Polly Montenegro Left 10/26/2016    Excision of apical left lung, apical blebs.  Pleurodesis  had spontaneous pneumothorax    NECK SURGERY      CYST AND HYOID BONE REMOVED  Dr Marion Wall Left 10/18/2016    for spontaneous pneumothorax         Current Outpatient Medications   Medication Sig Dispense Refill    ipratropium-albuterol (DUONEB) 0.5-2.5 (3) MG/3ML SOLN Minutes of Exercise per Session: Not on file   Stress:     Feeling of Stress : Not on file   Social Connections:     Frequency of Communication with Friends and Family: Not on file    Frequency of Social Gatherings with Friends and Family: Not on file    Attends Amish Services: Not on file    Active Member of Clubs or Organizations: Not on file    Attends Club or Organization Meetings: Not on file    Marital Status: Not on file   Intimate Partner Violence:     Fear of Current or Ex-Partner: Not on file    Emotionally Abused: Not on file    Physically Abused: Not on file    Sexually Abused: Not on file   Housing Stability:     Unable to Pay for Housing in the Last Year: Not on file    Number of [place] in the Last Year: Not on file    Unstable Housing in the Last Year: Not on file       Family History   Problem Relation Age of Onset    Hypertension Mother     COPD Mother     Thyroid Disease Mother     Other Mother 62        pacemaker       REVIEW OF SYSTEMS:     CONSTITUTIONAL:  negative for  fevers, chills, sweats and fatigue  HEENT:  negative for  tinnitus, earaches, nasal congestion and epistaxis  RESPIRATORY:  negative for  dry cough, cough with sputum, dyspnea, wheezing and hemoptysis  GASTROINTESTINAL:  negative for nausea, vomiting, diarrhea, constipation, pruritus and jaundice  HEMATOLOGIC/LYMPHATIC:  negative for easy bruising, bleeding, lymphadenopathy and petechiae  ENDOCRINE:  negative for heat intolerance, cold intolerance, tremor, hair loss and diabetic symptoms including neither polyuria nor polydipsia nor blurred vision  MUSCULOSKELETAL:  negative for  myalgias, arthralgias, joint swelling, stiff joints and decreased range of motion  NEUROLOGICAL:  negative for memory problems, speech problems, visual disturbance, dysphagia, weakness and numbness      PHYSICAL EXAM:   Constitutional:  Awake, alert cooperative, no apparent distress, and appears stated age.    EYES: Anicteric sclerae, lids and lashes normal and pupils equal, round and reactive to light. HEENT:  Moist and pink mucous membranes, normocephalic, without obvious abnormality, atraumatic, normal ears and nose. NECK:  Supple, symmetrical, trachea midline, no JVD, no adenopathy, thyroid symmetric, not enlarged and no tenderness, good carotid upstroke bilaterally, no carotid bruit, skin normal.   HEMA/LYMPH: No cervical lymphadenopathy and no supraclavicular lymphadenopathy. LUNGS: No increased work of breathing, good air exchange, clear to auscultation bilaterally, no crackles or wheezing. Cardiovascular: Normal apical impulse, regular rate and rhythm, normal S1 and S2, no S3 or S4, 3/6 systolic murmur at the apex, no pedal edema, good carotid upstroke bilaterally, no carotid bruit, no JVD, no abdominal pulsating masses. ABDOMEN: Soft, nontender, no hepatomegaly, no splenomegaly, bowel sound positive. CHEST:  Expands symmetrically, nontender to palpation. Musculoskeletal:  No clubbing or cyanosis. No redness, warmth, or swelling of the joints. Neurological: Alert, awake, and oriented X3, no focal neurologic deficit was appreciated. SKIN: No bruises, no bleeding, normal skin color, texture, turgor and no redness, warmth or swelling. /76   Pulse 63   Resp 16   Ht 5' 6\" (1.676 m)   Wt 135 lb 9.6 oz (61.5 kg)   BMI 21.89 kg/m²     DATA:   I personally reviewed the visit EKG with the following interpretation: Sinus rhythm, first-degree AV block, normal axis    EKG 2/11/21 Sinus rhythm, first-degree AV block, normal axis, no significant changes when compared to previous      ECHO: 3/5/19 Normal left ventricular systolic and diastolic dysfunction EF 17%. Mild mitral regurgitation. Trace tricuspid regurgitation with mild pulmonary HTN. Trace aortic regurgitation. Stress Test: 7/6/18 Impression:    1. Exercise EKG was normal.    2. The patient experienced no chest pain with exercise.    3. Duke treadmill score was 10 implying low risk of acute   ischemic events.    4. Exercise capacity was average. Cardiology Labs: BMP:    Lab Results   Component Value Date     05/01/2017    K 4.0 05/01/2017     05/01/2017    CO2 23 05/01/2017    BUN 7 05/01/2017    CREATININE 0.8 05/01/2017     CMP:    Lab Results   Component Value Date     05/01/2017    K 4.0 05/01/2017     05/01/2017    CO2 23 05/01/2017    BUN 7 05/01/2017    CREATININE 0.8 05/01/2017    PROT 7.2 05/01/2017     CBC:    Lab Results   Component Value Date    WBC 10.6 05/01/2017    RBC 4.58 05/01/2017    HGB 13.9 05/01/2017    HCT 42.6 05/01/2017    MCV 93.0 05/01/2017    RDW 12.2 05/01/2017     05/01/2017     PT/INR:  No results found for: PTINR  PT/INR Warfarin:  No components found for: PTPATWAR, PTINRWAR  PTT:  No results found for: APTT  PTT Heparin:  No components found for: APTTHEP  Magnesium:    Lab Results   Component Value Date    MG 2.1 05/01/2017     TSH:    Lab Results   Component Value Date    TSH 1.970 05/01/2017     TROPONIN:  No components found for: TROP  BNP:  No results found for: BNP  FASTING LIPID PANEL:  No results found for: CHOL, HDL, TRIG  No orders to display     I have personally reviewed the laboratory, cardiac diagnostic and radiographic testing as outlined above:      IMPRESSION:  1: Palpitations: will put on 48 hours Holter monitor for further evaluation  2:  Nonrheumatic mitral (valve) insufficiency:  Moderate by an echocardiogram done in June 2018    3 Nonrheumatic tricuspid (valve) insufficiency :  Mild                              RECOMMENDATIONS:   1.  48 hours Holter monitor to evaluate for palpitations  2.  Echocardiogram to follow-up on moderate mitral valve regurgitation diagnosed on echocardiogram done in 2018  3. Patient was strongly advised to call 911 if symptoms recur or get worse for any reason  4. Continue current treatment  5.   Preventive cardiology: Low-salt, low-cholesterol diet, daily exercise, were all advised. 6.  Follow-up with Dr. Myrna Jason as scheduled  7. Follow-up with Dr. Fernando Lyon after her tests    I have reviewed my findings and recommendations with patient    Electronically signed by Ainsley Swan MD on 3/15/2022 at 8:38 PM  NOTE: This report was transcribed using voice recognition software.  Every effort was made to ensure accuracy; however, inadvertent computerized transcription errors may be present

## 2022-03-11 ENCOUNTER — OFFICE VISIT (OUTPATIENT)
Dept: CARDIOLOGY CLINIC | Age: 42
End: 2022-03-11
Payer: COMMERCIAL

## 2022-03-11 VITALS
HEIGHT: 66 IN | BODY MASS INDEX: 21.79 KG/M2 | WEIGHT: 135.6 LBS | DIASTOLIC BLOOD PRESSURE: 76 MMHG | RESPIRATION RATE: 16 BRPM | SYSTOLIC BLOOD PRESSURE: 138 MMHG | HEART RATE: 63 BPM

## 2022-03-11 DIAGNOSIS — R00.2 PALPITATIONS: Primary | ICD-10-CM

## 2022-03-11 DIAGNOSIS — I34.0 NONRHEUMATIC MITRAL VALVE REGURGITATION: ICD-10-CM

## 2022-03-11 PROCEDURE — G8427 DOCREV CUR MEDS BY ELIG CLIN: HCPCS | Performed by: INTERNAL MEDICINE

## 2022-03-11 PROCEDURE — G8420 CALC BMI NORM PARAMETERS: HCPCS | Performed by: INTERNAL MEDICINE

## 2022-03-11 PROCEDURE — 1036F TOBACCO NON-USER: CPT | Performed by: INTERNAL MEDICINE

## 2022-03-11 PROCEDURE — 93000 ELECTROCARDIOGRAM COMPLETE: CPT | Performed by: INTERNAL MEDICINE

## 2022-03-11 PROCEDURE — G8484 FLU IMMUNIZE NO ADMIN: HCPCS | Performed by: INTERNAL MEDICINE

## 2022-03-11 PROCEDURE — 99214 OFFICE O/P EST MOD 30 MIN: CPT | Performed by: INTERNAL MEDICINE

## 2022-03-18 ENCOUNTER — TELEPHONE (OUTPATIENT)
Dept: CARDIOLOGY CLINIC | Age: 42
End: 2022-03-18

## 2022-03-18 DIAGNOSIS — R00.2 PALPITATIONS: ICD-10-CM

## 2022-07-26 ENCOUNTER — TELEPHONE (OUTPATIENT)
Dept: CARDIOLOGY | Age: 42
End: 2022-07-26

## 2022-07-26 NOTE — TELEPHONE ENCOUNTER
CALLED PATIENT AND LEFT MESSAGE TO SCHEDULE ECHO.     Electronically signed by Savannah Meléndez on 7/26/2022 at 1:51 PM

## 2022-10-18 ENCOUNTER — TELEPHONE (OUTPATIENT)
Dept: CARDIOLOGY | Age: 42
End: 2022-10-18

## 2022-11-08 ENCOUNTER — HOSPITAL ENCOUNTER (OUTPATIENT)
Dept: CARDIOLOGY | Age: 42
Discharge: HOME | End: 2022-11-08
Payer: COMMERCIAL

## 2022-11-08 DIAGNOSIS — I34.0 NONRHEUMATIC MITRAL VALVE REGURGITATION: ICD-10-CM

## 2022-11-08 LAB
LV EF: 55 %
LVEF MODALITY: NORMAL

## 2022-11-08 PROCEDURE — 93306 TTE W/DOPPLER COMPLETE: CPT

## 2022-12-29 ENCOUNTER — HOSPITAL ENCOUNTER (OUTPATIENT)
Dept: GENERAL RADIOLOGY | Age: 42
Discharge: HOME | End: 2022-12-31
Payer: COMMERCIAL

## 2022-12-29 ENCOUNTER — HOSPITAL ENCOUNTER (OUTPATIENT)
Age: 42
Discharge: HOME | End: 2022-12-31
Payer: COMMERCIAL

## 2022-12-29 DIAGNOSIS — J40 BRONCHITIS: ICD-10-CM

## 2022-12-29 PROCEDURE — 71046 X-RAY EXAM CHEST 2 VIEWS: CPT

## 2023-05-03 ENCOUNTER — OFFICE VISIT (OUTPATIENT)
Dept: CARDIOLOGY CLINIC | Age: 43
End: 2023-05-03
Payer: COMMERCIAL

## 2023-05-03 VITALS
WEIGHT: 135 LBS | DIASTOLIC BLOOD PRESSURE: 72 MMHG | SYSTOLIC BLOOD PRESSURE: 126 MMHG | HEART RATE: 71 BPM | BODY MASS INDEX: 21.69 KG/M2 | RESPIRATION RATE: 16 BRPM | HEIGHT: 66 IN

## 2023-05-03 DIAGNOSIS — I34.0 NONRHEUMATIC MITRAL VALVE REGURGITATION: Primary | ICD-10-CM

## 2023-05-03 PROCEDURE — 99213 OFFICE O/P EST LOW 20 MIN: CPT | Performed by: INTERNAL MEDICINE

## 2023-05-03 PROCEDURE — 1036F TOBACCO NON-USER: CPT | Performed by: INTERNAL MEDICINE

## 2023-05-03 PROCEDURE — G8420 CALC BMI NORM PARAMETERS: HCPCS | Performed by: INTERNAL MEDICINE

## 2023-05-03 PROCEDURE — G8427 DOCREV CUR MEDS BY ELIG CLIN: HCPCS | Performed by: INTERNAL MEDICINE

## 2023-05-03 PROCEDURE — 93000 ELECTROCARDIOGRAM COMPLETE: CPT | Performed by: INTERNAL MEDICINE

## 2023-05-03 NOTE — PROGRESS NOTES
[facility] Cardiology Progress Note  Dr. Lau January      Referring Physician: Janay Desai MD  CHIEF COMPLAINT:   Chief Complaint   Patient presents with    Heart Problem     Annual        HISTORY OF PRESENT ILLNESS:   Patient is 40year old female with past medical history of first degree atrioventricular block, mitral valve regurgitation, asthma, bulging discs, is here for follow-up appointment  Palpitations has been getting worse for the last few months, no associated symptoms, no chest pain, no lightheadedness, no dizziness,  no pedal edema, no PND, no orthopnea, no syncope, no presyncopal episodes. Functional capacity is at baseline   Past Medical History:   Diagnosis Date    Acute upper respiratory infection, unspecified     Asthma     Bradycardia     occurs during sleep ( HR drops to 30-40). see's Dr Valeria Hercules   (has Loop recorder)  denies any dizziness or syncope    Bulging discs     LUMBAR SPINE 4 DISCS    COPD (chronic obstructive pulmonary disease) (HCC)     mild    MVP (mitral valve prolapse)     Palpitations     Slipped intervertebral disc     L5-S1    Unspecified asthma with (acute) exacerbation     Vaginal delivery     x 2         Past Surgical History:   Procedure Laterality Date    DILATION AND CURETTAGE OF UTERUS      ELBOW FRACTURE SURGERY Right 12/16/2019    REPAIR RIGHT LATERAL EPICONDYLECTOMY (CPT 80560) performed by Federico Morrison DO at [address] Right 12/16/2019    INSERTABLE CARDIAC MONITOR  08/2016    Dr Whitney Groves Left 10/26/2016    Excision of apical left lung, apical blebs.  Pleurodesis  had spontaneous pneumothorax    NECK SURGERY      CYST AND HYOID BONE REMOVED  Dr Jeremiah Hutchison CYST REMOVAL      THORACOSCOPY Left 10/18/2016    for spontaneous pneumothorax         Current Outpatient Medications   Medication Sig Dispense Refill    ipratropium-albuterol (DUONEB) 0.5-2.5 (3) MG/3ML SOLN nebulizer solution Inhale 3 mLs into the lungs every 4

## 2023-09-09 ENCOUNTER — HOSPITAL ENCOUNTER (EMERGENCY)
Age: 43
Discharge: HOME | End: 2023-09-09
Attending: STUDENT IN AN ORGANIZED HEALTH CARE EDUCATION/TRAINING PROGRAM
Payer: COMMERCIAL

## 2023-09-09 VITALS
RESPIRATION RATE: 16 BRPM | HEART RATE: 104 BPM | SYSTOLIC BLOOD PRESSURE: 125 MMHG | DIASTOLIC BLOOD PRESSURE: 97 MMHG | WEIGHT: 133 LBS | HEIGHT: 66 IN | TEMPERATURE: 98.1 F | OXYGEN SATURATION: 99 % | BODY MASS INDEX: 21.38 KG/M2

## 2023-09-09 DIAGNOSIS — J06.9 UPPER RESPIRATORY TRACT INFECTION, UNSPECIFIED TYPE: Primary | ICD-10-CM

## 2023-09-09 PROCEDURE — 99283 EMERGENCY DEPT VISIT LOW MDM: CPT

## 2023-09-09 RX ORDER — BROMPHENIRAMINE MALEATE, PSEUDOEPHEDRINE HYDROCHLORIDE, AND DEXTROMETHORPHAN HYDROBROMIDE 2; 30; 10 MG/5ML; MG/5ML; MG/5ML
5 SYRUP ORAL 3 TIMES DAILY PRN
Qty: 118 ML | Refills: 0 | Status: SHIPPED | OUTPATIENT
Start: 2023-09-09

## 2023-09-09 RX ORDER — FLUTICASONE FUROATE, UMECLIDINIUM BROMIDE AND VILANTEROL TRIFENATATE 200; 62.5; 25 UG/1; UG/1; UG/1
1 POWDER RESPIRATORY (INHALATION) DAILY
COMMUNITY

## 2023-09-09 RX ORDER — FLUTICASONE PROPIONATE 50 MCG
2 SPRAY, SUSPENSION (ML) NASAL DAILY
Qty: 48 G | Refills: 1 | Status: SHIPPED | OUTPATIENT
Start: 2023-09-09

## 2023-09-09 ASSESSMENT — ENCOUNTER SYMPTOMS
COLOR CHANGE: 0
BACK PAIN: 0
COUGH: 1
NAUSEA: 0
SHORTNESS OF BREATH: 0
VOMITING: 0
ABDOMINAL PAIN: 0
DIARRHEA: 0
SORE THROAT: 1

## 2023-09-09 NOTE — ED PROVIDER NOTES
HPI   This is a 44-year-old female patient presents emergency department for evaluation of cough congestion. Patient reporting symptoms all started earlier today. She denies any chest pain or shortness of breath. No fevers or chills no nausea vomiting or diarrhea. Review of Systems   Constitutional:  Negative for chills and fever. HENT:  Positive for congestion and sore throat. Respiratory:  Positive for cough. Negative for shortness of breath. Cardiovascular:  Negative for chest pain. Gastrointestinal:  Negative for abdominal pain, diarrhea, nausea and vomiting. Genitourinary:  Negative for difficulty urinating, dysuria and hematuria. Musculoskeletal:  Negative for back pain. Skin:  Negative for color change. All other systems reviewed and are negative. Physical Exam  Vitals and nursing note reviewed. Constitutional:       Appearance: Normal appearance. HENT:      Head: Normocephalic and atraumatic. Nose: Congestion present. Mouth/Throat:      Mouth: Mucous membranes are moist.      Pharynx: Oropharynx is clear. No oropharyngeal exudate or posterior oropharyngeal erythema. Eyes:      Conjunctiva/sclera: Conjunctivae normal.      Pupils: Pupils are equal, round, and reactive to light. Cardiovascular:      Rate and Rhythm: Normal rate and regular rhythm. Pulses: Normal pulses. Heart sounds: Normal heart sounds. Pulmonary:      Effort: Pulmonary effort is normal. No respiratory distress. Breath sounds: Normal breath sounds. Abdominal:      General: Bowel sounds are normal. There is no distension. Tenderness: There is no abdominal tenderness. Musculoskeletal:         General: Normal range of motion. Cervical back: Normal range of motion and neck supple. Skin:     General: Skin is warm and dry. Capillary Refill: Capillary refill takes less than 2 seconds. Neurological:      General: No focal deficit present.       Mental Status: She

## 2023-09-12 ENCOUNTER — HOSPITAL ENCOUNTER (OUTPATIENT)
Dept: GENERAL RADIOLOGY | Age: 43
Discharge: HOME | End: 2023-09-14
Payer: COMMERCIAL

## 2023-09-12 ENCOUNTER — HOSPITAL ENCOUNTER (OUTPATIENT)
Age: 43
Discharge: HOME | End: 2023-09-14
Payer: COMMERCIAL

## 2023-09-12 DIAGNOSIS — J40 BRONCHITIS, NOT SPECIFIED AS ACUTE OR CHRONIC: ICD-10-CM

## 2023-09-12 PROCEDURE — 71046 X-RAY EXAM CHEST 2 VIEWS: CPT

## 2024-06-27 ENCOUNTER — HOSPITAL ENCOUNTER (OUTPATIENT)
Age: 44
Discharge: HOME | End: 2024-06-27
Payer: COMMERCIAL

## 2024-06-27 LAB — B-HCG SERPL EIA 3RD IS-ACNC: ABNORMAL MIU/ML (ref 0–7)

## 2024-06-27 PROCEDURE — 84702 CHORIONIC GONADOTROPIN TEST: CPT

## 2024-06-27 PROCEDURE — 36415 COLL VENOUS BLD VENIPUNCTURE: CPT

## 2024-06-29 ENCOUNTER — HOSPITAL ENCOUNTER (OUTPATIENT)
Age: 44
Discharge: HOME | End: 2024-06-29
Payer: COMMERCIAL

## 2024-06-29 LAB — B-HCG SERPL EIA 3RD IS-ACNC: 2137 MIU/ML (ref 0–7)

## 2024-06-29 PROCEDURE — 36415 COLL VENOUS BLD VENIPUNCTURE: CPT

## 2024-06-29 PROCEDURE — 84702 CHORIONIC GONADOTROPIN TEST: CPT

## 2024-12-09 ENCOUNTER — TELEPHONE (OUTPATIENT)
Dept: CARDIOLOGY CLINIC | Age: 44
End: 2024-12-09

## 2024-12-09 NOTE — TELEPHONE ENCOUNTER
CANCELLED APPT 12/6/24 DUE TO POWER OUTAGE AND LEFT VOICEMAIL FOR CRISTINO THAT DAY. ATTEMPTED TO CALL HER BACK TODAY TO RESCHEDULE THIS APPOINTMENT AND LEFT ANOTHER VOICEMAIL.

## 2025-01-13 ENCOUNTER — OFFICE VISIT (OUTPATIENT)
Dept: CARDIOLOGY CLINIC | Age: 45
End: 2025-01-13
Payer: COMMERCIAL

## 2025-01-13 VITALS
BODY MASS INDEX: 23.3 KG/M2 | RESPIRATION RATE: 16 BRPM | DIASTOLIC BLOOD PRESSURE: 64 MMHG | WEIGHT: 145 LBS | SYSTOLIC BLOOD PRESSURE: 108 MMHG | HEART RATE: 68 BPM | HEIGHT: 66 IN

## 2025-01-13 DIAGNOSIS — I34.0 NONRHEUMATIC MITRAL VALVE REGURGITATION: Primary | ICD-10-CM

## 2025-01-13 PROCEDURE — 93000 ELECTROCARDIOGRAM COMPLETE: CPT | Performed by: INTERNAL MEDICINE

## 2025-01-13 PROCEDURE — G8420 CALC BMI NORM PARAMETERS: HCPCS | Performed by: INTERNAL MEDICINE

## 2025-01-13 PROCEDURE — 99213 OFFICE O/P EST LOW 20 MIN: CPT | Performed by: INTERNAL MEDICINE

## 2025-01-13 PROCEDURE — G8427 DOCREV CUR MEDS BY ELIG CLIN: HCPCS | Performed by: INTERNAL MEDICINE

## 2025-01-13 PROCEDURE — 1036F TOBACCO NON-USER: CPT | Performed by: INTERNAL MEDICINE

## 2025-01-13 RX ORDER — METOPROLOL TARTRATE 25 MG/1
12.5 TABLET, FILM COATED ORAL 2 TIMES DAILY PRN
Qty: 30 TABLET | Refills: 5 | Status: SHIPPED | OUTPATIENT
Start: 2025-01-13

## 2025-01-13 RX ORDER — BUDESONIDE, GLYCOPYRROLATE, AND FORMOTEROL FUMARATE 160; 9; 4.8 UG/1; UG/1; UG/1
AEROSOL, METERED RESPIRATORY (INHALATION)
COMMUNITY
Start: 2024-06-28

## 2025-01-13 RX ORDER — BENRALIZUMAB 30 MG/ML
30 INJECTION, SOLUTION SUBCUTANEOUS
COMMUNITY
Start: 2024-07-24

## 2025-01-13 NOTE — PROGRESS NOTES
[facility] Cardiology Progress Note  Dr. Jenny Freitas      Referring Physician: Wilfrido Wells MD  CHIEF COMPLAINT:   Chief Complaint   Patient presents with    Heart Problem     Annual        HISTORY OF PRESENT ILLNESS:   Patient is 44 year old female with past medical history of first degree atrioventricular block, mitral valve regurgitation, asthma, bulging discs, is here for follow-up appointment  Occasional palpitations, no associated symptoms, no chest pain, no lightheadedness, no dizziness,  no pedal edema, no PND, no orthopnea, no syncope, no presyncopal episodes.  Functional capacity is at baseline   Past Medical History:   Diagnosis Date    Acute upper respiratory infection, unspecified     Asthma     Bradycardia     occurs during sleep ( HR drops to 30-40). see's Dr Porter   (has Loop recorder)  denies any dizziness or syncope    Bulging discs     LUMBAR SPINE 4 DISCS    COPD (chronic obstructive pulmonary disease) (HCC)     mild    MVP (mitral valve prolapse)     Palpitations     Slipped intervertebral disc     L5-S1    Unspecified asthma with (acute) exacerbation     Vaginal delivery     x 2         Past Surgical History:   Procedure Laterality Date    DILATION AND CURETTAGE OF UTERUS      ELBOW FRACTURE SURGERY Right 12/16/2019    REPAIR RIGHT LATERAL EPICONDYLECTOMY (CPT 98214) performed by Kevin Massey DO at [facility] OR    ELBOW SURGERY Right 12/16/2019    INSERTABLE CARDIAC MONITOR  08/2016    Dr Porter    LUNG SURGERY Left 10/26/2016    Excision of apical left lung, apical blebs. Pleurodesis  had spontaneous pneumothorax    NECK SURGERY      CYST AND HYOID BONE REMOVED  Dr Holden    OVARIAN CYST REMOVAL      THORACOSCOPY Left 10/18/2016    for spontaneous pneumothorax         Current Outpatient Medications   Medication Sig Dispense Refill    BREZI AEROSPHERE 160-9-4.8 MCG/ACT AERO       Benralizumab (FASENRA PEN) 30 MG/ML SOAJ Inject 1 mL into the skin      metoprolol tartrate

## 2025-07-23 ENCOUNTER — HOSPITAL ENCOUNTER (EMERGENCY)
Facility: HOSPITAL | Age: 45
Discharge: HOME | End: 2025-07-24
Attending: EMERGENCY MEDICINE
Payer: COMMERCIAL

## 2025-07-23 DIAGNOSIS — R10.9 FLANK PAIN: Primary | ICD-10-CM

## 2025-07-23 DIAGNOSIS — N20.0 KIDNEY STONE: ICD-10-CM

## 2025-07-23 LAB
BASOPHILS # BLD AUTO: 0.01 X10*3/UL (ref 0–0.1)
BASOPHILS NFR BLD AUTO: 0.1 %
EOSINOPHIL # BLD AUTO: 0 X10*3/UL (ref 0–0.7)
EOSINOPHIL NFR BLD AUTO: 0 %
ERYTHROCYTE [DISTWIDTH] IN BLOOD BY AUTOMATED COUNT: 12.2 % (ref 11.5–14.5)
HCT VFR BLD AUTO: 38.5 % (ref 36–46)
HGB BLD-MCNC: 13.2 G/DL (ref 12–16)
IMM GRANULOCYTES # BLD AUTO: 0.02 X10*3/UL (ref 0–0.7)
IMM GRANULOCYTES NFR BLD AUTO: 0.3 % (ref 0–0.9)
LYMPHOCYTES # BLD AUTO: 2.3 X10*3/UL (ref 1.2–4.8)
LYMPHOCYTES NFR BLD AUTO: 30 %
MCH RBC QN AUTO: 31.7 PG (ref 26–34)
MCHC RBC AUTO-ENTMCNC: 34.3 G/DL (ref 32–36)
MCV RBC AUTO: 92 FL (ref 80–100)
MONOCYTES # BLD AUTO: 0.59 X10*3/UL (ref 0.1–1)
MONOCYTES NFR BLD AUTO: 7.7 %
NEUTROPHILS # BLD AUTO: 4.75 X10*3/UL (ref 1.2–7.7)
NEUTROPHILS NFR BLD AUTO: 61.9 %
NRBC BLD-RTO: 0 /100 WBCS (ref 0–0)
PLATELET # BLD AUTO: 271 X10*3/UL (ref 150–450)
RBC # BLD AUTO: 4.17 X10*6/UL (ref 4–5.2)
WBC # BLD AUTO: 7.7 X10*3/UL (ref 4.4–11.3)

## 2025-07-23 PROCEDURE — 85025 COMPLETE CBC W/AUTO DIFF WBC: CPT | Performed by: NURSE PRACTITIONER

## 2025-07-23 PROCEDURE — 80053 COMPREHEN METABOLIC PANEL: CPT | Performed by: NURSE PRACTITIONER

## 2025-07-23 PROCEDURE — 85730 THROMBOPLASTIN TIME PARTIAL: CPT | Performed by: NURSE PRACTITIONER

## 2025-07-23 PROCEDURE — 36415 COLL VENOUS BLD VENIPUNCTURE: CPT | Performed by: NURSE PRACTITIONER

## 2025-07-23 PROCEDURE — 83735 ASSAY OF MAGNESIUM: CPT | Performed by: NURSE PRACTITIONER

## 2025-07-23 PROCEDURE — 96374 THER/PROPH/DIAG INJ IV PUSH: CPT

## 2025-07-23 PROCEDURE — 2500000004 HC RX 250 GENERAL PHARMACY W/ HCPCS (ALT 636 FOR OP/ED): Performed by: NURSE PRACTITIONER

## 2025-07-23 PROCEDURE — 99285 EMERGENCY DEPT VISIT HI MDM: CPT | Mod: 25 | Performed by: EMERGENCY MEDICINE

## 2025-07-23 PROCEDURE — 85610 PROTHROMBIN TIME: CPT | Performed by: NURSE PRACTITIONER

## 2025-07-23 PROCEDURE — 83605 ASSAY OF LACTIC ACID: CPT | Performed by: NURSE PRACTITIONER

## 2025-07-23 PROCEDURE — 96375 TX/PRO/DX INJ NEW DRUG ADDON: CPT

## 2025-07-23 PROCEDURE — 96372 THER/PROPH/DIAG INJ SC/IM: CPT | Performed by: NURSE PRACTITIONER

## 2025-07-23 RX ORDER — ONDANSETRON HYDROCHLORIDE 2 MG/ML
4 INJECTION, SOLUTION INTRAVENOUS ONCE
Status: COMPLETED | OUTPATIENT
Start: 2025-07-23 | End: 2025-07-23

## 2025-07-23 RX ORDER — ORPHENADRINE CITRATE 30 MG/ML
60 INJECTION INTRAMUSCULAR; INTRAVENOUS ONCE
Status: COMPLETED | OUTPATIENT
Start: 2025-07-23 | End: 2025-07-23

## 2025-07-23 RX ORDER — HYDROMORPHONE HYDROCHLORIDE 1 MG/ML
1 INJECTION, SOLUTION INTRAMUSCULAR; INTRAVENOUS; SUBCUTANEOUS ONCE
Status: COMPLETED | OUTPATIENT
Start: 2025-07-23 | End: 2025-07-23

## 2025-07-23 RX ADMIN — ONDANSETRON 4 MG: 2 INJECTION, SOLUTION INTRAMUSCULAR; INTRAVENOUS at 23:46

## 2025-07-23 RX ADMIN — HYDROMORPHONE HYDROCHLORIDE 1 MG: 1 INJECTION, SOLUTION INTRAMUSCULAR; INTRAVENOUS; SUBCUTANEOUS at 23:47

## 2025-07-23 RX ADMIN — SODIUM CHLORIDE 1000 ML: 0.9 INJECTION, SOLUTION INTRAVENOUS at 23:53

## 2025-07-23 RX ADMIN — ORPHENADRINE CITRATE 60 MG: 60 INJECTION INTRAMUSCULAR; INTRAVENOUS at 23:46

## 2025-07-23 ASSESSMENT — PAIN DESCRIPTION - DESCRIPTORS: DESCRIPTORS: SHARP

## 2025-07-23 ASSESSMENT — LIFESTYLE VARIABLES
HAVE YOU EVER FELT YOU SHOULD CUT DOWN ON YOUR DRINKING: NO
TOTAL SCORE: 0
EVER HAD A DRINK FIRST THING IN THE MORNING TO STEADY YOUR NERVES TO GET RID OF A HANGOVER: NO
EVER FELT BAD OR GUILTY ABOUT YOUR DRINKING: NO
HAVE PEOPLE ANNOYED YOU BY CRITICIZING YOUR DRINKING: NO

## 2025-07-23 ASSESSMENT — PAIN DESCRIPTION - PAIN TYPE: TYPE: ACUTE PAIN

## 2025-07-23 ASSESSMENT — PAIN - FUNCTIONAL ASSESSMENT: PAIN_FUNCTIONAL_ASSESSMENT: 0-10

## 2025-07-23 ASSESSMENT — PAIN DESCRIPTION - ORIENTATION: ORIENTATION: RIGHT;LOWER

## 2025-07-23 ASSESSMENT — PAIN DESCRIPTION - FREQUENCY: FREQUENCY: CONSTANT/CONTINUOUS

## 2025-07-23 ASSESSMENT — PAIN DESCRIPTION - LOCATION: LOCATION: BACK

## 2025-07-23 ASSESSMENT — PAIN DESCRIPTION - ONSET: ONSET: SUDDEN

## 2025-07-23 ASSESSMENT — PAIN SCALES - GENERAL: PAINLEVEL_OUTOF10: 9

## 2025-07-23 NOTE — Clinical Note
Arianna Grissom was seen and treated in our emergency department on 7/23/2025.  She may return to work on 07/26/2025.       If you have any questions or concerns, please don't hesitate to call.      Michelle Kessler

## 2025-07-24 ENCOUNTER — APPOINTMENT (OUTPATIENT)
Dept: RADIOLOGY | Facility: HOSPITAL | Age: 45
End: 2025-07-24
Payer: COMMERCIAL

## 2025-07-24 VITALS
RESPIRATION RATE: 15 BRPM | DIASTOLIC BLOOD PRESSURE: 72 MMHG | TEMPERATURE: 97.2 F | BODY MASS INDEX: 24.75 KG/M2 | WEIGHT: 154 LBS | HEART RATE: 63 BPM | SYSTOLIC BLOOD PRESSURE: 116 MMHG | HEIGHT: 66 IN | OXYGEN SATURATION: 99 %

## 2025-07-24 LAB
ALBUMIN SERPL BCP-MCNC: 4.2 G/DL (ref 3.4–5)
ALP SERPL-CCNC: 57 U/L (ref 33–110)
ALT SERPL W P-5'-P-CCNC: 11 U/L (ref 7–45)
ANION GAP SERPL CALC-SCNC: 13 MMOL/L (ref 10–20)
APPEARANCE UR: CLEAR
APTT PPP: 26 SECONDS (ref 26–36)
AST SERPL W P-5'-P-CCNC: 12 U/L (ref 9–39)
BILIRUB SERPL-MCNC: 0.5 MG/DL (ref 0–1.2)
BILIRUB UR STRIP.AUTO-MCNC: NEGATIVE MG/DL
BUN SERPL-MCNC: 9 MG/DL (ref 6–23)
CALCIUM SERPL-MCNC: 9.1 MG/DL (ref 8.6–10.3)
CHLORIDE SERPL-SCNC: 105 MMOL/L (ref 98–107)
CO2 SERPL-SCNC: 26 MMOL/L (ref 21–32)
COLOR UR: YELLOW
CREAT SERPL-MCNC: 0.77 MG/DL (ref 0.5–1.05)
EGFRCR SERPLBLD CKD-EPI 2021: >90 ML/MIN/1.73M*2
GLUCOSE SERPL-MCNC: 103 MG/DL (ref 74–99)
GLUCOSE UR STRIP.AUTO-MCNC: NORMAL MG/DL
HCG UR QL IA.RAPID: NEGATIVE
HOLD SPECIMEN: NORMAL
INR PPP: 1.1 (ref 0.9–1.1)
KETONES UR STRIP.AUTO-MCNC: NEGATIVE MG/DL
LACTATE SERPL-SCNC: 0.6 MMOL/L (ref 0.4–2)
LEUKOCYTE ESTERASE UR QL STRIP.AUTO: NEGATIVE
MAGNESIUM SERPL-MCNC: 2 MG/DL (ref 1.6–2.4)
MUCOUS THREADS #/AREA URNS AUTO: ABNORMAL /LPF
NITRITE UR QL STRIP.AUTO: NEGATIVE
PH UR STRIP.AUTO: 5.5 [PH]
POTASSIUM SERPL-SCNC: 3.5 MMOL/L (ref 3.5–5.3)
PROT SERPL-MCNC: 6.6 G/DL (ref 6.4–8.2)
PROT UR STRIP.AUTO-MCNC: ABNORMAL MG/DL
PROTHROMBIN TIME: 12.2 SECONDS (ref 9.8–12.4)
RBC # UR STRIP.AUTO: ABNORMAL MG/DL
RBC #/AREA URNS AUTO: >20 /HPF
SODIUM SERPL-SCNC: 140 MMOL/L (ref 136–145)
SP GR UR STRIP.AUTO: 1.04
SQUAMOUS #/AREA URNS AUTO: ABNORMAL /HPF
UROBILINOGEN UR STRIP.AUTO-MCNC: NORMAL MG/DL
WBC #/AREA URNS AUTO: ABNORMAL /HPF

## 2025-07-24 PROCEDURE — 96361 HYDRATE IV INFUSION ADD-ON: CPT

## 2025-07-24 PROCEDURE — 2500000002 HC RX 250 W HCPCS SELF ADMINISTERED DRUGS (ALT 637 FOR MEDICARE OP, ALT 636 FOR OP/ED): Performed by: EMERGENCY MEDICINE

## 2025-07-24 PROCEDURE — 81001 URINALYSIS AUTO W/SCOPE: CPT | Performed by: NURSE PRACTITIONER

## 2025-07-24 PROCEDURE — 96375 TX/PRO/DX INJ NEW DRUG ADDON: CPT

## 2025-07-24 PROCEDURE — 2550000001 HC RX 255 CONTRASTS: Performed by: NURSE PRACTITIONER

## 2025-07-24 PROCEDURE — 74177 CT ABD & PELVIS W/CONTRAST: CPT | Performed by: RADIOLOGY

## 2025-07-24 PROCEDURE — 2500000004 HC RX 250 GENERAL PHARMACY W/ HCPCS (ALT 636 FOR OP/ED): Mod: JZ | Performed by: EMERGENCY MEDICINE

## 2025-07-24 PROCEDURE — 81025 URINE PREGNANCY TEST: CPT | Performed by: NURSE PRACTITIONER

## 2025-07-24 PROCEDURE — 74177 CT ABD & PELVIS W/CONTRAST: CPT

## 2025-07-24 RX ORDER — TAMSULOSIN HYDROCHLORIDE 0.4 MG/1
0.4 CAPSULE ORAL ONCE
Status: COMPLETED | OUTPATIENT
Start: 2025-07-24 | End: 2025-07-24

## 2025-07-24 RX ORDER — KETOROLAC TROMETHAMINE 15 MG/ML
15 INJECTION, SOLUTION INTRAMUSCULAR; INTRAVENOUS ONCE
Status: COMPLETED | OUTPATIENT
Start: 2025-07-24 | End: 2025-07-24

## 2025-07-24 RX ORDER — TAMSULOSIN HYDROCHLORIDE 0.4 MG/1
0.4 CAPSULE ORAL
Qty: 7 CAPSULE | Refills: 0 | Status: SHIPPED | OUTPATIENT
Start: 2025-07-24

## 2025-07-24 RX ADMIN — TAMSULOSIN HYDROCHLORIDE 0.4 MG: 0.4 CAPSULE ORAL at 02:47

## 2025-07-24 RX ADMIN — IOHEXOL 75 ML: 350 INJECTION, SOLUTION INTRAVENOUS at 00:40

## 2025-07-24 RX ADMIN — KETOROLAC TROMETHAMINE 15 MG: 15 INJECTION, SOLUTION INTRAMUSCULAR; INTRAVENOUS at 01:51

## 2025-07-24 ASSESSMENT — PAIN - FUNCTIONAL ASSESSMENT: PAIN_FUNCTIONAL_ASSESSMENT: 0-10

## 2025-07-24 ASSESSMENT — PAIN DESCRIPTION - LOCATION: LOCATION: ABDOMEN

## 2025-07-24 ASSESSMENT — PAIN DESCRIPTION - PROGRESSION: CLINICAL_PROGRESSION: GRADUALLY IMPROVING

## 2025-07-24 ASSESSMENT — PAIN DESCRIPTION - PAIN TYPE: TYPE: ACUTE PAIN

## 2025-07-24 ASSESSMENT — PAIN DESCRIPTION - ORIENTATION: ORIENTATION: RIGHT;LOWER

## 2025-07-24 ASSESSMENT — PAIN DESCRIPTION - FREQUENCY: FREQUENCY: INTERMITTENT

## 2025-07-24 ASSESSMENT — PAIN DESCRIPTION - ONSET: ONSET: SUDDEN

## 2025-07-24 ASSESSMENT — PAIN SCALES - GENERAL: PAINLEVEL_OUTOF10: 2

## 2025-07-24 ASSESSMENT — PAIN DESCRIPTION - DESCRIPTORS: DESCRIPTORS: SHARP

## 2025-07-24 NOTE — ED PROVIDER NOTES
[facility]  ED  Encounter Note  Admit Date/RoomTime: 2025 10:58 PM  ED Room: [facility]  NAME: Arianna Grissom  : 1980  MRN: [id number]     Chief Complaint:  Flank Pain    HISTORY OF PRESENT ILLNESS        Arianna Grissom is a 45 y.o. female who presents to the ED for evaluation of right flank pain. 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 She stated that she took otc meds but did not help. Denied hx of kidney stones but thought it could be m/s in nature. She does drink fair amount of soda and tea. No fever or chills or urinary frequency.     Pain however so bad that she called 911 and received toradol 15 mg iv and zofran 4 mg ivp which did not help.    ROS   Pertinent positives and negatives are stated within HPI, all other systems reviewed and are negative.    Past Medical History:  has no past medical history on file.    Surgical History:  has no past surgical history on file.    Social History:  reports that she has been smoking cigarettes. She has never used smokeless tobacco. She reports that she does not currently use alcohol. She reports that she does not use drugs.    Family History: family history is not on file.     Allergies: Penicillins    PHYSICAL EXAM   Oxygen Saturation Interpretation: Normal.      Physical Exam  Constitutional/General: Alert and oriented x3, well appearing, non toxic  HEENT:  NC/NT. PERRLA.  Airway patent.  Neck: Supple, full ROM. No midline vertebral tenderness or crepitus.   Respiratory: Lung sounds clear to auscultation bilaterally. No wheezes, rhonchi or stridor. Not in respiratory distress.  CV:  Regular rate. Regular rhythm. No murmurs or rubs. 2+ distal pulses.  GI:  Abdomen soft, non-tender, non-distended. +BS. No rebound, guarding, or rigidity. No pulsatile masses.  Musculoskeletal: Moves all extremities x 4. Warm and well perfused. Capillary refill <3 seconds  Integument:  Skin warm and dry. No rashes.   Neurologic: Alert and oriented with no focal deficits, symmetric strength 5/5 in the upper and lower extremities bilaterally.  Psychiatric: Normal affect.//right flank pain tender to touch    Lab / Imaging Results   (All laboratory and radiology results have been personally reviewed by myself)  Labs:  Results for orders placed or performed during the hospital encounter of 07/23/25   CBC and Auto Differential    Collection Time: 07/23/25 11:49 PM   Result Value Ref Range    WBC 7.7 4.4 - 11.3 x10*3/uL    nRBC 0.0 0.0 - 0.0 /100 WBCs    RBC 4.17 4.00 - 5.20 x10*6/uL    Hemoglobin 13.2 12.0 - 16.0 g/dL    Hematocrit 38.5 36.0 - 46.0 %    MCV 92 80 - 100 fL    MCH 31.7 26.0 - 34.0 pg    MCHC 34.3 32.0 - 36.0 g/dL    RDW 12.2 11.5 - 14.5 %    Platelets 271 150 - 450 x10*3/uL    Neutrophils % 61.9 40.0 - 80.0 %    Immature Granulocytes %, Automated 0.3 0.0 - 0.9 %    Lymphocytes % 30.0 13.0 - 44.0 %    Monocytes % 7.7 2.0 - 10.0 %    Eosinophils % 0.0 0.0 - 6.0 %    Basophils % 0.1 0.0 - 2.0 %    Neutrophils Absolute 4.75 1.20 - 7.70 x10*3/uL    Immature Granulocytes Absolute, Automated 0.02 0.00 - 0.70 x10*3/uL    Lymphocytes Absolute 2.30 1.20 - 4.80 x10*3/uL    Monocytes Absolute 0.59 0.10 - 1.00 x10*3/uL    Eosinophils Absolute 0.00 0.00 - 0.70 x10*3/uL    Basophils Absolute 0.01 0.00 - 0.10 x10*3/uL   Magnesium    Collection Time: 07/23/25 11:49 PM   Result Value Ref Range    Magnesium 2.00 1.60 - 2.40 mg/dL   Comprehensive metabolic panel    Collection Time: 07/23/25 11:49 PM   Result Value Ref Range    Glucose 103 (H) 74 - 99 mg/dL    Sodium 140 136 - 145 mmol/L    Potassium 3.5 3.5 - 5.3 mmol/L    Chloride 105 98 - 107 mmol/L    Bicarbonate 26 21 - 32 mmol/L    Anion Gap 13 10 - 20 mmol/L    Urea Nitrogen 9 6 - 23 mg/dL    Creatinine 0.77 0.50 - 1.05 mg/dL    eGFR >90 >60 mL/min/1.73m*2    Calcium 9.1 8.6 - 10.3 mg/dL    Albumin 4.2 3.4 - 5.0 g/dL    Alkaline Phosphatase 57 33 - 110  U/L    Total Protein 6.6 6.4 - 8.2 g/dL    AST 12 9 - 39 U/L    Bilirubin, Total 0.5 0.0 - 1.2 mg/dL    ALT 11 7 - 45 U/L   Lactate    Collection Time: 07/23/25 11:49 PM   Result Value Ref Range    Lactate 0.6 0.4 - 2.0 mmol/L   Protime-INR    Collection Time: 07/23/25 11:49 PM   Result Value Ref Range    Protime 12.2 9.8 - 12.4 seconds    INR 1.1 0.9 - 1.1   aPTT    Collection Time: 07/23/25 11:49 PM   Result Value Ref Range    aPTT 26 26 - 36 seconds   hCG, Urine, Qualitative    Collection Time: 07/24/25 12:53 AM   Result Value Ref Range    HCG, Urine NEGATIVE NEGATIVE   Urinalysis with Reflex Culture and Microscopic    Collection Time: 07/24/25 12:53 AM   Result Value Ref Range    Color, Urine Yellow Light-Yellow, Yellow, Dark-Yellow    Appearance, Urine Clear Clear    Specific Gravity, Urine 1.043 (N) 1.005 - 1.035    pH, Urine 5.5 5.0, 5.5, 6.0, 6.5, 7.0, 7.5, 8.0    Protein, Urine 10 (TRACE) NEGATIVE, 10 (TRACE), 20 (TRACE) mg/dL    Glucose, Urine Normal Normal mg/dL    Blood, Urine OVER (3+) (A) NEGATIVE mg/dL    Ketones, Urine NEGATIVE NEGATIVE mg/dL    Bilirubin, Urine NEGATIVE NEGATIVE mg/dL    Urobilinogen, Urine Normal Normal mg/dL    Nitrite, Urine NEGATIVE NEGATIVE    Leukocyte Esterase, Urine NEGATIVE NEGATIVE   Extra Urine Gray Tube    Collection Time: 07/24/25 12:53 AM   Result Value Ref Range    Extra Tube     Urinalysis Microscopic    Collection Time: 07/24/25 12:53 AM   Result Value Ref Range    WBC, Urine 6-10 (A) 1-5, NONE /HPF    RBC, Urine >20 (A) NONE, 1-2, 3-5 /HPF    Squamous Epithelial Cells, Urine 1-9 (SPARSE) Reference range not established. /HPF    Mucus, Urine 1+ Reference range not established. /LPF     Imaging:  All Radiology results interpreted by Radiologist unless otherwise noted.  CT abdomen pelvis w IV contrast   Final Result   Obstructing stone seen in the right distal ureter measuring about 3-4   mm with moderate upstream hydronephrosis.        . Normal appendix. Mild  diverticulosis.        Somewhat tubular left adnexal structure. I do not identify normal   ovarian tissue. Consider pelvic ultrasound to exclude dilated   fallopian tube.        MACRO:   None        Signed by: Seth Talley 7/24/2025 1:20 AM   Dictation workstation:   [id number]          ED Course / Medical Decision Making     Medications   sodium chloride 0.9 % bolus 1,000 mL (0 mL intravenous Stopped 7/24/25 0128)   HYDROmorphone (Dilaudid) injection 1 mg (1 mg intramuscular Given 7/23/25 2347)   ondansetron (Zofran) injection 4 mg (4 mg intravenous Given 7/23/25 2346)   orphenadrine (Norflex) injection 60 mg (60 mg intravenous Given 7/23/25 2346)   iohexol (OMNIPaque) 350 mg iodine/mL solution 75 mL (75 mL intravenous Given 7/24/25 0040)   ketorolac (Toradol) injection 15 mg (15 mg intravenous Given 7/24/25 0151)   tamsulosin (Flomax) 24 hr capsule 0.4 mg (0.4 mg oral Given 7/24/25 0247)     ED Course as of 07/24/25 2314   Thu Jul 24, 2025   0220 Patient is improved on reexam after getting Toradol.  I talked her about kidney stones and gave her follow-up with urology.  Work note was also given. [RZ]      ED Course User Index  [RZ] Alejandro Wyatt MD         Diagnoses as of 07/24/25 2314   Flank pain   Kidney stone         MDM:       Patient with flank pain on right that started at work. Labs stable, urine pending. Ct scan abd pelvis pending in sign out to attending for evaluation and disposition. Feeling better with iv fluids.  Ct scan results noted  + kidney stone, and was able to be discharged home with outpatient followup.  Ddx: right flank pain, kidney stone     Plan of Care/Counseling:  I reviewed today's visit with the patient in addition to providing specific details for the plan of care and counseling regarding the diagnosis and prognosis.  Questions are answered at this time and are agreeable with the plan.    ASSESSMENT     1. Flank pain    2. Kidney stone      PLAN   Home Referral urology and  PCP, Advised to return for signs of head injury, weakness, numbness or tingling to extremities, incontinence, and Advised to return for worsening or additional problems such as abdominal or chest pain  Diagnostic tests were reviewed and questions answered. Diagnosis, care plan and treatment options were discussed. The patient understand instructions and will follow up as directed.  Condition stable  The patient was given verbal follow-up instructions  Patient condition is stable    New Medications     New Medications Ordered This Visit   Medications    sodium chloride 0.9 % bolus 1,000 mL    HYDROmorphone (Dilaudid) injection 1 mg    ondansetron (Zofran) injection 4 mg    orphenadrine (Norflex) injection 60 mg    iohexol (OMNIPaque) 350 mg iodine/mL solution 75 mL    ketorolac (Toradol) injection 15 mg    tamsulosin (Flomax) 24 hr capsule 0.4 mg    tamsulosin (Flomax) 0.4 mg 24 hr capsule     Sig: Take 1 capsule (0.4 mg) by mouth once daily in the morning. Take before meals.     Dispense:  7 capsule     Refill:  0     Electronically signed by SCARLETT Smith   DD: [unfilled]  **This report was transcribed using voice recognition software. Every effort was made to ensure accuracy; however, inadvertent computerized transcription errors may be present.  END OF ED PROVIDER NOTE     SCARLETT Smith  07/24/25 3621

## 2025-07-24 NOTE — ED PROVIDER NOTES
The patient was seen by the midlevel/resident.  I have personally saw the patient and made/approved the management plan and take responsibility for the patient management.  I reviewed the EKG's (when done) and agree with the interpretation.  I have seen and examined the patient; agree with the workup, evaluation, MDM, and diagnosis.  The care plan has been discussed with the midlevel/resident; I have reviewed the note and agree with the documented findings.     Right-sided flank pain found to have a kidney stone 3 to 4 mm.  Also had some blood in the urine.  Patient is improved hide receiving Toradol we discharged home can return indications follow-up instructions.  I gave her referral to urology as well.  No indication for hospitalization.  ED Course as of 07/24/25 0221   Thu Jul 24, 2025 0220 Patient is improved on reexam after getting Toradol.  I talked her about kidney stones and gave her follow-up with urology.  Work note was also given. [RZ]      ED Course User Index  [RZ] Alejandro Wyatt MD         Diagnoses as of 07/24/25 0221   Flank pain   Kidney stone     MD Alejandro Montanez MD  07/24/25 0221

## 2025-07-24 NOTE — ED TRIAGE NOTES
Pt arrived via EMS from work where she had a sharp pain start on her right side. Pain started in her back and radiated towards the front. Pt states 9/ 10 pain. EMS gave 15 mg of Toradol and 4 mg of Zofran.

## (undated) DEVICE — TOWEL OR BLUEE 16X26IN ST 8 PACK ORB08 16X26ORTWL

## (undated) DEVICE — BASIC PACK: Brand: CONVERTORS

## (undated) DEVICE — SLING ARM 9 1/2X20IN L MULTIPAK

## (undated) DEVICE — INTENDED FOR TISSUE SEPARATION, AND OTHER PROCEDURES THAT REQUIRE A SHARP SURGICAL BLADE TO PUNCTURE OR CUT.: Brand: BARD-PARKER ® STAINLESS STEEL BLADES

## (undated) DEVICE — SYRINGE BLB 50CC IRRIG PLIABLE FNGR FLNG GRAD FLSK DISP

## (undated) DEVICE — HANDLE CVR PATENTED RETENTION DISC STRL LIGHT SHLD

## (undated) DEVICE — MEDI-VAC NON-CONDUCTIVE SUCTION TUBING: Brand: CARDINAL HEALTH

## (undated) DEVICE — GLOVE SURG 7 LTX TRIFLEX WIDE FINGER PWDR

## (undated) DEVICE — GAUZE,SPONGE,4"X4",16PLY,XRAY,STRL,LF: Brand: MEDLINE

## (undated) DEVICE — CURITY NON-ADHERENT STRIPS: Brand: CURITY

## (undated) DEVICE — GOWN SURG XL SMS FAB NONREINFORCED RAGLAN SLV HK LOOP CLSR

## (undated) DEVICE — CHLORAPREP 26ML ORANGE

## (undated) DEVICE — GAUZE,SPONGE,4"X4",16PLY,STRL,LF,10/TRAY: Brand: MEDLINE

## (undated) DEVICE — SOLUTION IV IRRIG POUR BRL 0.9% SODIUM CHL 2F7124

## (undated) DEVICE — PAD ABD CURITY TENDERSORB 5X9IN

## (undated) DEVICE — PEN: MARKING STD 100/CS: Brand: MEDICAL ACTION INDUSTRIES

## (undated) DEVICE — CONVERTORS STOCKINETTE: Brand: CONVERTORS